# Patient Record
Sex: FEMALE | Race: WHITE | NOT HISPANIC OR LATINO | Employment: OTHER | URBAN - METROPOLITAN AREA
[De-identification: names, ages, dates, MRNs, and addresses within clinical notes are randomized per-mention and may not be internally consistent; named-entity substitution may affect disease eponyms.]

---

## 2017-05-16 ENCOUNTER — OFFICE VISIT (OUTPATIENT)
Dept: ENDOCRINOLOGY | Facility: CLINIC | Age: 77
End: 2017-05-16

## 2017-05-16 VITALS
DIASTOLIC BLOOD PRESSURE: 64 MMHG | OXYGEN SATURATION: 97 % | HEART RATE: 61 BPM | WEIGHT: 139 LBS | BODY MASS INDEX: 25.58 KG/M2 | HEIGHT: 62 IN | SYSTOLIC BLOOD PRESSURE: 132 MMHG

## 2017-05-16 DIAGNOSIS — E04.2 NON-TOXIC MULTINODULAR GOITER: ICD-10-CM

## 2017-05-16 DIAGNOSIS — M83.8 OTHER ADULT OSTEOMALACIA: ICD-10-CM

## 2017-05-16 DIAGNOSIS — I10 BENIGN ESSENTIAL HYPERTENSION: Primary | ICD-10-CM

## 2017-05-16 DIAGNOSIS — M85.9 DISORDER OF BONE DENSITY AND STRUCTURE, UNSPECIFIED: ICD-10-CM

## 2017-05-16 DIAGNOSIS — E87.1 HYPONATREMIA: ICD-10-CM

## 2017-05-16 DIAGNOSIS — I10 ESSENTIAL HYPERTENSION: ICD-10-CM

## 2017-05-16 DIAGNOSIS — E78.01 FAMILIAL HYPERCHOLESTEROLEMIA: ICD-10-CM

## 2017-05-16 PROBLEM — S82.891G CLOSED FRACTURE OF RIGHT ANKLE WITH DELAYED HEALING: Status: ACTIVE | Noted: 2017-05-16

## 2017-05-16 LAB
25(OH)D3+25(OH)D2 SERPL-MCNC: 48.1 NG/ML
ALBUMIN SERPL-MCNC: 4.6 G/DL (ref 3.2–4.8)
ALBUMIN/GLOB SERPL: 1.7 G/DL (ref 1.5–2.5)
ALP SERPL-CCNC: 52 U/L (ref 25–100)
ALT SERPL-CCNC: 17 U/L (ref 7–40)
AST SERPL-CCNC: 21 U/L (ref 0–33)
BASOPHILS # BLD AUTO: 0.11 10*3/MM3 (ref 0–0.2)
BASOPHILS NFR BLD AUTO: 2.1 % (ref 0–1)
BILIRUB SERPL-MCNC: 0.8 MG/DL (ref 0.3–1.2)
BUN SERPL-MCNC: 17 MG/DL (ref 9–23)
BUN/CREAT SERPL: 24.3 (ref 7–25)
CALCIUM SERPL-MCNC: 10.5 MG/DL (ref 8.7–10.4)
CHLORIDE SERPL-SCNC: 104 MMOL/L (ref 99–109)
CHOLEST SERPL-MCNC: 178 MG/DL (ref 0–200)
CO2 SERPL-SCNC: 30 MMOL/L (ref 20–31)
CREAT SERPL-MCNC: 0.7 MG/DL (ref 0.6–1.3)
DIFFERENTIAL COMMENT: NORMAL
EOSINOPHIL # BLD AUTO: 0.17 10*3/MM3 (ref 0.1–0.3)
EOSINOPHIL NFR BLD AUTO: 3.2 % (ref 0–3)
ERYTHROCYTE [DISTWIDTH] IN BLOOD BY AUTOMATED COUNT: 13.6 % (ref 11.3–14.5)
GLOBULIN SER CALC-MCNC: 2.7 GM/DL
GLUCOSE SERPL-MCNC: 107 MG/DL (ref 70–100)
HCT VFR BLD AUTO: 38 % (ref 34.5–44)
HDLC SERPL-MCNC: 50 MG/DL (ref 40–60)
HGB BLD-MCNC: 12.8 G/DL (ref 11.5–15.5)
IMM GRANULOCYTES # BLD: 0.02 10*3/MM3 (ref 0–0.03)
IMM GRANULOCYTES NFR BLD: 0.4 % (ref 0–0.6)
LDLC SERPL CALC-MCNC: 110 MG/DL (ref 0–100)
LYMPHOCYTES # BLD AUTO: 1.59 10*3/MM3 (ref 0.6–4.8)
LYMPHOCYTES NFR BLD AUTO: 30.3 % (ref 24–44)
MCH RBC QN AUTO: 32.7 PG (ref 27–31)
MCHC RBC AUTO-ENTMCNC: 33.7 G/DL (ref 32–36)
MCV RBC AUTO: 97.2 FL (ref 80–99)
MONOCYTES # BLD AUTO: 0.64 10*3/MM3 (ref 0–1)
MONOCYTES NFR BLD AUTO: 12.2 % (ref 0–12)
NEUTROPHILS # BLD AUTO: 2.72 10*3/MM3 (ref 1.5–8.3)
NEUTROPHILS NFR BLD AUTO: 51.8 % (ref 41–71)
PLATELET # BLD AUTO: 128 10*3/MM3 (ref 150–450)
PLATELET BLD QL SMEAR: NORMAL
POTASSIUM SERPL-SCNC: 4.4 MMOL/L (ref 3.5–5.5)
PROT SERPL-MCNC: 7.3 G/DL (ref 5.7–8.2)
RBC # BLD AUTO: 3.91 10*6/MM3 (ref 3.89–5.14)
RBC MORPH BLD: NORMAL
SODIUM SERPL-SCNC: 137 MMOL/L (ref 132–146)
T4 FREE SERPL-MCNC: 1.1 NG/DL (ref 0.89–1.76)
TRIGL SERPL-MCNC: 88 MG/DL (ref 0–150)
TSH SERPL DL<=0.005 MIU/L-ACNC: 2.41 MIU/ML (ref 0.35–5.35)
VLDLC SERPL CALC-MCNC: 17.6 MG/DL
WBC # BLD AUTO: 5.25 10*3/MM3 (ref 3.5–10.8)

## 2017-05-16 PROCEDURE — 99214 OFFICE O/P EST MOD 30 MIN: CPT | Performed by: INTERNAL MEDICINE

## 2017-05-16 RX ORDER — RAMIPRIL 10 MG/1
10 CAPSULE ORAL EVERY 12 HOURS
Qty: 180 CAPSULE | Refills: 3 | Status: SHIPPED | OUTPATIENT
Start: 2017-05-16 | End: 2018-05-21 | Stop reason: SDUPTHER

## 2017-05-16 RX ORDER — AMLODIPINE BESYLATE 5 MG/1
5 TABLET ORAL NIGHTLY
Qty: 90 TABLET | Refills: 3 | Status: SHIPPED | OUTPATIENT
Start: 2017-05-16 | End: 2018-05-21 | Stop reason: SDUPTHER

## 2017-06-12 ENCOUNTER — TRANSCRIBE ORDERS (OUTPATIENT)
Dept: ADMINISTRATIVE | Facility: HOSPITAL | Age: 77
End: 2017-06-12

## 2017-06-12 DIAGNOSIS — Z12.31 VISIT FOR SCREENING MAMMOGRAM: Primary | ICD-10-CM

## 2017-09-13 ENCOUNTER — HOSPITAL ENCOUNTER (OUTPATIENT)
Dept: MAMMOGRAPHY | Facility: HOSPITAL | Age: 77
Discharge: HOME | End: 2017-09-13
Attending: OBSTETRICS & GYNECOLOGY | Admitting: OBSTETRICS & GYNECOLOGY

## 2017-09-13 DIAGNOSIS — Z12.31 VISIT FOR SCREENING MAMMOGRAM: ICD-10-CM

## 2017-09-13 PROCEDURE — 77063 BREAST TOMOSYNTHESIS BI: CPT

## 2017-09-13 PROCEDURE — 77063 BREAST TOMOSYNTHESIS BI: CPT | Performed by: RADIOLOGY

## 2017-09-13 PROCEDURE — G0202 SCR MAMMO BI INCL CAD: HCPCS

## 2017-09-13 PROCEDURE — G0202 SCR MAMMO BI INCL CAD: HCPCS | Performed by: RADIOLOGY

## 2017-11-20 ENCOUNTER — OFFICE VISIT (OUTPATIENT)
Dept: ENDOCRINOLOGY | Facility: CLINIC | Age: 77
End: 2017-11-20

## 2017-11-20 VITALS
WEIGHT: 137 LBS | OXYGEN SATURATION: 98 % | BODY MASS INDEX: 25.86 KG/M2 | HEIGHT: 61 IN | DIASTOLIC BLOOD PRESSURE: 80 MMHG | SYSTOLIC BLOOD PRESSURE: 180 MMHG | HEART RATE: 57 BPM

## 2017-11-20 DIAGNOSIS — I10 BENIGN ESSENTIAL HYPERTENSION: Primary | ICD-10-CM

## 2017-11-20 DIAGNOSIS — E04.2 NON-TOXIC MULTINODULAR GOITER: ICD-10-CM

## 2017-11-20 DIAGNOSIS — E87.6 HYPOKALEMIA: ICD-10-CM

## 2017-11-20 DIAGNOSIS — E55.9 VITAMIN D DEFICIENCY: ICD-10-CM

## 2017-11-20 DIAGNOSIS — E78.01 FAMILIAL HYPERCHOLESTEROLEMIA: ICD-10-CM

## 2017-11-20 LAB
25(OH)D3+25(OH)D2 SERPL-MCNC: 69.8 NG/ML
ALBUMIN SERPL-MCNC: 4.8 G/DL (ref 3.2–4.8)
ALBUMIN/GLOB SERPL: 1.8 G/DL (ref 1.5–2.5)
ALP SERPL-CCNC: 57 U/L (ref 25–100)
ALT SERPL-CCNC: 17 U/L (ref 7–40)
AST SERPL-CCNC: 19 U/L (ref 0–33)
BASOPHILS # BLD AUTO: 0.07 10*3/MM3 (ref 0–0.2)
BASOPHILS NFR BLD AUTO: 1.1 % (ref 0–1)
BILIRUB SERPL-MCNC: 1 MG/DL (ref 0.3–1.2)
BUN SERPL-MCNC: 15 MG/DL (ref 9–23)
BUN/CREAT SERPL: 18.8 (ref 7–25)
CALCIUM SERPL-MCNC: 10 MG/DL (ref 8.7–10.4)
CHLORIDE SERPL-SCNC: 103 MMOL/L (ref 99–109)
CHOLEST SERPL-MCNC: 160 MG/DL (ref 0–200)
CO2 SERPL-SCNC: 30 MMOL/L (ref 20–31)
CREAT SERPL-MCNC: 0.8 MG/DL (ref 0.6–1.3)
DIFFERENTIAL COMMENT: NORMAL
EOSINOPHIL # BLD AUTO: 0.18 10*3/MM3 (ref 0–0.3)
EOSINOPHIL NFR BLD AUTO: 2.8 % (ref 0–3)
ERYTHROCYTE [DISTWIDTH] IN BLOOD BY AUTOMATED COUNT: 13.9 % (ref 11.3–14.5)
GFR SERPLBLD CREATININE-BSD FMLA CKD-EPI: 70 ML/MIN/1.73
GFR SERPLBLD CREATININE-BSD FMLA CKD-EPI: 85 ML/MIN/1.73
GLOBULIN SER CALC-MCNC: 2.6 GM/DL
GLUCOSE SERPL-MCNC: 103 MG/DL (ref 70–100)
HCT VFR BLD AUTO: 37.6 % (ref 34.5–44)
HDLC SERPL-MCNC: 42 MG/DL (ref 40–60)
HGB BLD-MCNC: 12.9 G/DL (ref 11.5–15.5)
IMM GRANULOCYTES # BLD: 0.02 10*3/MM3 (ref 0–0.03)
IMM GRANULOCYTES NFR BLD: 0.3 % (ref 0–0.6)
LDLC SERPL CALC-MCNC: 94 MG/DL (ref 0–100)
LYMPHOCYTES # BLD AUTO: 1.63 10*3/MM3 (ref 0.6–4.8)
LYMPHOCYTES NFR BLD AUTO: 25.6 % (ref 24–44)
MCH RBC QN AUTO: 33.9 PG (ref 27–31)
MCHC RBC AUTO-ENTMCNC: 34.3 G/DL (ref 32–36)
MCV RBC AUTO: 98.9 FL (ref 80–99)
MONOCYTES # BLD AUTO: 0.64 10*3/MM3 (ref 0–1)
MONOCYTES NFR BLD AUTO: 10 % (ref 0–12)
NEUTROPHILS # BLD AUTO: 3.83 10*3/MM3 (ref 1.5–8.3)
NEUTROPHILS NFR BLD AUTO: 60.2 % (ref 41–71)
PLATELET # BLD AUTO: 119 10*3/MM3 (ref 150–450)
PLATELET BLD QL SMEAR: NORMAL
POTASSIUM SERPL-SCNC: 4.4 MMOL/L (ref 3.5–5.5)
PROT SERPL-MCNC: 7.4 G/DL (ref 5.7–8.2)
RBC # BLD AUTO: 3.8 10*6/MM3 (ref 3.89–5.14)
RBC MORPH BLD: NORMAL
SODIUM SERPL-SCNC: 138 MMOL/L (ref 132–146)
TRIGL SERPL-MCNC: 118 MG/DL (ref 0–150)
TSH SERPL DL<=0.005 MIU/L-ACNC: 2.34 MIU/ML (ref 0.35–5.35)
VLDLC SERPL CALC-MCNC: 23.6 MG/DL
WBC # BLD AUTO: 6.37 10*3/MM3 (ref 3.5–10.8)

## 2017-11-20 PROCEDURE — 99214 OFFICE O/P EST MOD 30 MIN: CPT | Performed by: INTERNAL MEDICINE

## 2017-11-20 PROCEDURE — 90662 IIV NO PRSV INCREASED AG IM: CPT | Performed by: INTERNAL MEDICINE

## 2017-11-20 PROCEDURE — 90471 IMMUNIZATION ADMIN: CPT | Performed by: INTERNAL MEDICINE

## 2017-11-20 NOTE — PROGRESS NOTES
Zainab PONCE [place] 77 y.o.  CC:Follow-up; Hypertension; Multinodular goiter; Hyperlipidemia; and Hypokalemia      [place]: Follow-up; Hypertension; Multinodular goiter; Hyperlipidemia; and Hypokalemia    bp is good   Is on a low fat diet  Is on ace inhibitor, no potassium supplement   MNG - saw Dr Oscar in past- [facility] follow up but no intervention  Right ankle post op - some swelling still but no pain   Pulled hamstring left - is taking a break from walking  Continue to see Dr Merino     Allergies   Allergen Reactions   • Latex        Current Outpatient Prescriptions:   •  amLODIPine (NORVASC) 5 MG tablet, Take 1 tablet by mouth Every Night., Disp: 90 tablet, Rfl: 3  •  aspirin (ASPIRIN LOW DOSE) 81 MG tablet, Take 1 tablet by mouth daily., Disp: , Rfl:   •  Cholecalciferol (VITAMIN D3) 1000 UNITS capsule, Take 2 capsules by mouth daily., Disp: , Rfl:   •  calcium citrate-vitamin d (CITRACAL PETITES/VITAMIN D) 200-250 MG-UNIT tablet tablet, Take 3 tablets by mouth daily., Disp: , Rfl:   •  clotrimazole-betamethasone (LOTRISONE) 1-0.05 % cream, , Disp: , Rfl: 1  •  mesalamine (APRISO) 0.375 G 24 hr capsule, Take 1 capsule by mouth 2 (two) times a day., Disp: , Rfl:   •  Misc Natural Products (OSTEO BI-FLEX TRIPLE STRENGTH) tablet, Take  by mouth., Disp: , Rfl:   •  Multiple Vitamins-Minerals (CENTRUM SILVER) tablet, Take 1 tablet by mouth daily., Disp: , Rfl:   •  Omega-3 Fatty Acids (FISH OIL) 1200 MG capsule capsule, Take 1 capsule by mouth daily., Disp: , Rfl:   •  ramipril (ALTACE) 10 MG capsule, Take 1 capsule by mouth Every 12 (Twelve) Hours., Disp: 180 capsule, Rfl: 3  •  Turmeric 450 MG capsule, Take  by mouth., Disp: , Rfl:   Patient Active Problem List    Diagnosis   • Closed fracture of right ankle with delayed healing [S82.891G]   • Disorder of bone density and structure, unspecified [M85.9]   • Other adult osteomalacia  [M83.8]   • Acute cystitis without hematuria [N30.00]   • Benign essential hypertension [I10]      Overview Note:     Impression: 05/18/2015 - bp is good at home  Impression: 05/20/2014 - bp is good overall  continue to monitor- no changes at present;      • Cardiac arrhythmia [I49.9]   • Cervical radiculopathy [M54.12]   • H/O colonoscopy [Z98.890]     Overview Note:     Description: 6/24/11- ulcerative colitis- Ruff  5/14 ruff    5/17 ruff -stable      • Migraine [G43.909]     Overview Note:     . Common migraine without aura      • Gastroesophageal reflux disease [K21.9]   • Familial hypercholesterolemia [E78.01]     Overview Note:     Impression: 11/19/2015 - check flp  Impression: 05/18/2015 - check flp  Impression: 05/20/2014 - check flp;      • Diffuse goiter [E04.9]     Overview Note:     Impression: 05/20/2014 - check u/s- updated u/s scheduled; Description: goiter- 9/11- second opinion Dayne- no intervention     • Hypertension [I10]     Overview Note:     Impression: 11/19/2015 - bp is good;      • Hypokalemia [E87.6]     Overview Note:     Impression: 05/18/2015 - check cmp;      • Hyponatremia [E87.1]     Overview Note:     Impression: 11/19/2015 - check cmp  Impression: 05/20/2014 - update cmp; Description: ?  d/c dyazide     • Knee pain [M25.569]     Overview Note:     Impression: 11/19/2015 - had injection   PT order provided   discussed nsaid, watch bp while using;      • Low back pain [M54.5]     Overview Note:     Description: Damian - Lumbar disc dz- 6/13 better with PT and TENS unit- will see back prn.     • Abnormal mammogram [R92.8]   • Non-toxic multinodular goiter [E04.2]     Overview Note:     Description: Stable 9/11 - Saw Aydin Oscar- rec no intervent- just follow     • Sacroiliac joint somatic dysfunction [M99.04]     Overview Note:     Description: [place]- mobic and tens     • Thrombocytopenia [D69.6]     Overview Note:     Impression: 05/18/2015 - check cbc  Impression: 05/20/2014 - update cbc;      • Tick bite [W57.XXXA]     Overview Note:     Impression: 05/18/2015 - left  neck- keep area clean and dry- call if fever or other problems   discussed possibility of Lyme disease and she will call if she develops fever or chills;      • Ulcerative colitis [K51.90]     Overview Note:     Description: Primitivo     • Varicose veins [I83.90]   • Vasovagal syncope [R55]   • Candidiasis of vagina [B37.3]     Overview Note:     Impression: 11/19/2015 - recurrent- cont cream and diflucan rx provided prn;        Review of Systems   Constitutional: Negative for activity change, appetite change, chills, diaphoresis, fatigue, fever and unexpected weight change.   HENT: Negative for congestion, dental problem, drooling, ear discharge, ear pain, facial swelling, hearing loss, mouth sores, nosebleeds, postnasal drip, rhinorrhea, sinus pressure, sneezing, sore throat, tinnitus, trouble swallowing and voice change.    Eyes: Negative for photophobia, pain, discharge, redness, itching and visual disturbance.   Respiratory: Negative for apnea, cough, choking, chest tightness, shortness of breath, wheezing and stridor.    Cardiovascular: Negative for chest pain, palpitations and leg swelling.   Gastrointestinal: Negative for abdominal distention, abdominal pain, anal bleeding, blood in stool, constipation, diarrhea, nausea, rectal pain and vomiting.        Ulcerative colitis- on apreslo    Endocrine: Negative for cold intolerance, heat intolerance, polydipsia, polyphagia and polyuria.   Genitourinary: Negative for decreased urine volume, difficulty urinating, dysuria, enuresis, flank pain, frequency, genital sores, hematuria and urgency.   Musculoskeletal: Negative for arthralgias, back pain, gait problem, joint swelling, myalgias, neck pain and neck stiffness.   Skin: Negative for color change, pallor, rash and wound.   Allergic/Immunologic: Negative for environmental allergies, food allergies and immunocompromised state.   Neurological: Negative for dizziness, tremors, seizures, syncope, facial asymmetry,  "speech difficulty, weakness, light-headedness, numbness and headaches.   Hematological: Negative for adenopathy. Does not bruise/bleed easily.   Psychiatric/Behavioral: Negative for agitation, behavioral problems, confusion, decreased concentration, dysphoric mood, hallucinations, self-injury, sleep disturbance and suicidal ideas. The patient is not nervous/anxious and is not hyperactive.      Social History     Social History   • Marital status:      Spouse name: N/A   • Number of children: N/A   • Years of education: N/A     Occupational History   • Not on file.     Social History Main Topics   • Smoking status: Never Smoker   • Smokeless tobacco: Not on file   • Alcohol use No   • Drug use: No   • Sexual activity: Defer     Other Topics Concern   • Not on file     Social History Narrative     Family History   Problem Relation Age of Onset   • Heart disease Mother    • Heart disease Father    • Cancer Father      colon   • Alcohol abuse Father    • Hypertension Sister    • Cancer Brother    • Heart disease Brother    • Breast cancer Neg Hx    • Ovarian cancer Neg Hx      /80  Pulse 57  Ht 61\" (154.9 cm)  Wt 137 lb (62.1 kg)  LMP  (LMP Unknown)  SpO2 98%  BMI 25.89 kg/m2  Physical Exam   Constitutional: She is oriented to person, place, and time. She appears well-developed and well-nourished.   Recheck bp 162/78   HENT:   Head: Normocephalic and atraumatic.   Nose: Nose normal.   Mouth/Throat: Oropharynx is clear and moist.   Eyes: Conjunctivae, EOM and lids are normal. Pupils are equal, round, and reactive to light.   Neck: Trachea normal and normal range of motion. Neck supple. Carotid bruit is not present. No tracheal deviation present. No thyroid mass and no thyromegaly present.   Cardiovascular: Normal rate, regular rhythm, normal heart sounds and intact distal pulses.  Exam reveals no gallop and no friction rub.    No murmur heard.  Pulmonary/Chest: Effort normal and breath sounds normal. " No respiratory distress. She has no wheezes.   Musculoskeletal: Normal range of motion. She exhibits edema. She exhibits no deformity.   Lymphadenopathy:     She has no cervical adenopathy.   Neurological: She is alert and oriented to person, place, and time. She has normal reflexes. She displays normal reflexes. No cranial nerve deficit.   Skin: Skin is warm and dry. No rash noted. No cyanosis or erythema. Nails show no clubbing.   Psychiatric: She has a normal mood and affect. Her speech is normal and behavior is normal. Judgment and thought content normal. Cognition and memory are normal.   Nursing note and vitals reviewed.    Results for orders placed or performed in visit on 05/16/17   TSH   Result Value Ref Range    TSH 2.407 0.350 - 5.350 mIU/mL   T4, Free   Result Value Ref Range    Free T4 1.10 0.89 - 1.76 ng/dL   Comprehensive Metabolic Panel   Result Value Ref Range    Glucose 107 (H) 70 - 100 mg/dL    BUN 17 9 - 23 mg/dL    Creatinine 0.70 0.60 - 1.30 mg/dL    eGFR Non African Am 82 >60 mL/min/1.73    eGFR African Am 99 >60 mL/min/1.73    BUN/Creatinine Ratio 24.3 7.0 - 25.0    Sodium 137 132 - 146 mmol/L    Potassium 4.4 3.5 - 5.5 mmol/L    Chloride 104 99 - 109 mmol/L    Total CO2 30.0 20.0 - 31.0 mmol/L    Calcium 10.5 (H) 8.7 - 10.4 mg/dL    Total Protein 7.3 5.7 - 8.2 g/dL    Albumin 4.60 3.20 - 4.80 g/dL    Globulin 2.7 gm/dL    A/G Ratio 1.7 1.5 - 2.5 g/dL    Total Bilirubin 0.8 0.3 - 1.2 mg/dL    Alkaline Phosphatase 52 25 - 100 U/L    AST (SGOT) 21 0 - 33 U/L    ALT (SGPT) 17 7 - 40 U/L   Lipid Panel   Result Value Ref Range    Total Cholesterol 178 0 - 200 mg/dL    Triglycerides 88 0 - 150 mg/dL    HDL Cholesterol 50 40 - 60 mg/dL    VLDL Cholesterol 17.6 mg/dL    LDL Cholesterol  110 (H) 0 - 100 mg/dL   Vitamin D 25 Hydroxy   Result Value Ref Range    25 Hydroxy, Vitamin D 48.1 ng/mL   CBC & Differential   Result Value Ref Range    WBC 5.25 3.50 - 10.80 10*3/mm3    RBC 3.91 3.89 - 5.14  10*6/mm3    Hemoglobin 12.8 11.5 - 15.5 g/dL    Hematocrit 38.0 34.5 - 44.0 %    MCV 97.2 80.0 - 99.0 fL    MCH 32.7 (H) 27.0 - 31.0 pg    MCHC 33.7 32.0 - 36.0 g/dL    RDW 13.6 11.3 - 14.5 %    Platelets 128 (L) 150 - 450 10*3/mm3    Neutrophil Rel % 51.8 41.0 - 71.0 %    Lymphocyte Rel % 30.3 24.0 - 44.0 %    Monocyte Rel % 12.2 (H) 0.0 - 12.0 %    Eosinophil Rel % 3.2 (H) 0.0 - 3.0 %    Basophil Rel % 2.1 (H) 0.0 - 1.0 %    Neutrophils Absolute 2.72 1.50 - 8.30 10*3/mm3    Lymphocytes Absolute 1.59 0.60 - 4.80 10*3/mm3    Monocytes Absolute 0.64 0.00 - 1.00 10*3/mm3    Eosinophils Absolute 0.17 0.10 - 0.30 10*3/mm3    Basophils Absolute 0.11 0.00 - 0.20 10*3/mm3    Immature Granulocyte Rel % 0.4 0.0 - 0.6 %    Immature Grans Absolute 0.02 0.00 - 0.03 10*3/mm3   Manual Differential   Result Value Ref Range    Differential Comment Comment     Comment Comment     Plt Comment Comment      Problem List Items Addressed This Visit        Cardiovascular and Mediastinum    Benign essential hypertension - Primary     bp is better with recheck   Discussed monitoring and call / email if over 145/85   Watch salt          Relevant Orders    CBC & Differential    Familial hypercholesterolemia     Check flp          Relevant Orders    Comprehensive Metabolic Panel    Lipid Panel       Digestive    Vitamin D deficiency     Update vitamin D levels          Relevant Orders    Vitamin D 25 Hydroxy       Endocrine    Non-toxic multinodular goiter     Exam stable   No dysphagia or hoarseness  occ choking          Relevant Orders    TSH       Other    Hypokalemia     Check cmp              Return in about 6 months (around 5/20/2018) for Recheck 30 min .      Liberty Berry MA  Signed Cynthia Farmer MD

## 2018-05-21 ENCOUNTER — OFFICE VISIT (OUTPATIENT)
Dept: ENDOCRINOLOGY | Facility: CLINIC | Age: 78
End: 2018-05-21

## 2018-05-21 VITALS
WEIGHT: 140 LBS | HEART RATE: 72 BPM | OXYGEN SATURATION: 97 % | HEIGHT: 61 IN | DIASTOLIC BLOOD PRESSURE: 78 MMHG | SYSTOLIC BLOOD PRESSURE: 127 MMHG | BODY MASS INDEX: 26.43 KG/M2

## 2018-05-21 DIAGNOSIS — I10 ESSENTIAL HYPERTENSION: ICD-10-CM

## 2018-05-21 DIAGNOSIS — L60.0 INGROWN RIGHT BIG TOENAIL: ICD-10-CM

## 2018-05-21 DIAGNOSIS — G31.84 MILD COGNITIVE IMPAIRMENT: ICD-10-CM

## 2018-05-21 DIAGNOSIS — E55.9 VITAMIN D DEFICIENCY: ICD-10-CM

## 2018-05-21 DIAGNOSIS — E04.2 NON-TOXIC MULTINODULAR GOITER: ICD-10-CM

## 2018-05-21 DIAGNOSIS — E78.01 FAMILIAL HYPERCHOLESTEROLEMIA: Primary | ICD-10-CM

## 2018-05-21 LAB
ALBUMIN SERPL-MCNC: 4.5 G/DL (ref 3.2–4.8)
ALBUMIN/GLOB SERPL: 1.6 G/DL (ref 1.5–2.5)
ALP SERPL-CCNC: 57 U/L (ref 25–100)
ALT SERPL-CCNC: 21 U/L (ref 7–40)
AST SERPL-CCNC: 19 U/L (ref 0–33)
BILIRUB SERPL-MCNC: 0.9 MG/DL (ref 0.3–1.2)
BUN SERPL-MCNC: 14 MG/DL (ref 9–23)
BUN/CREAT SERPL: 17.5 (ref 7–25)
CALCIUM SERPL-MCNC: 9.7 MG/DL (ref 8.7–10.4)
CHLORIDE SERPL-SCNC: 104 MMOL/L (ref 99–109)
CHOLEST SERPL-MCNC: 153 MG/DL (ref 0–200)
CO2 SERPL-SCNC: 28 MMOL/L (ref 20–31)
CREAT SERPL-MCNC: 0.8 MG/DL (ref 0.6–1.3)
GFR SERPLBLD CREATININE-BSD FMLA CKD-EPI: 70 ML/MIN/1.73
GFR SERPLBLD CREATININE-BSD FMLA CKD-EPI: 85 ML/MIN/1.73
GLOBULIN SER CALC-MCNC: 2.9 GM/DL
GLUCOSE SERPL-MCNC: 108 MG/DL (ref 70–100)
HDLC SERPL-MCNC: 44 MG/DL (ref 40–60)
LDLC SERPL CALC-MCNC: 90 MG/DL (ref 0–100)
POTASSIUM SERPL-SCNC: 4.4 MMOL/L (ref 3.5–5.5)
PROT SERPL-MCNC: 7.4 G/DL (ref 5.7–8.2)
SODIUM SERPL-SCNC: 138 MMOL/L (ref 132–146)
TRIGL SERPL-MCNC: 96 MG/DL (ref 0–150)
TSH SERPL DL<=0.005 MIU/L-ACNC: 2.08 MIU/ML (ref 0.35–5.35)
VLDLC SERPL CALC-MCNC: 19.2 MG/DL

## 2018-05-21 PROCEDURE — 99214 OFFICE O/P EST MOD 30 MIN: CPT | Performed by: INTERNAL MEDICINE

## 2018-05-21 RX ORDER — AMLODIPINE BESYLATE 5 MG/1
5 TABLET ORAL NIGHTLY
Qty: 90 TABLET | Refills: 3 | Status: SHIPPED | OUTPATIENT
Start: 2018-05-21 | End: 2019-06-07 | Stop reason: SDUPTHER

## 2018-05-21 RX ORDER — MEMANTINE HYDROCHLORIDE 5 MG/1
5 TABLET ORAL 2 TIMES DAILY
Qty: 60 TABLET | Refills: 5 | Status: SHIPPED | OUTPATIENT
Start: 2018-05-21 | End: 2018-11-27 | Stop reason: SDUPTHER

## 2018-05-21 RX ORDER — RAMIPRIL 10 MG/1
10 CAPSULE ORAL EVERY 12 HOURS
Qty: 180 CAPSULE | Refills: 3 | Status: SHIPPED | OUTPATIENT
Start: 2018-05-21 | End: 2019-06-08 | Stop reason: SDUPTHER

## 2018-07-16 ENCOUNTER — TRANSCRIBE ORDERS (OUTPATIENT)
Dept: ADMINISTRATIVE | Facility: HOSPITAL | Age: 78
End: 2018-07-16

## 2018-07-16 DIAGNOSIS — Z12.31 VISIT FOR SCREENING MAMMOGRAM: Primary | ICD-10-CM

## 2018-09-20 ENCOUNTER — HOSPITAL ENCOUNTER (OUTPATIENT)
Dept: MAMMOGRAPHY | Facility: HOSPITAL | Age: 78
Discharge: HOME | End: 2018-09-20
Attending: OBSTETRICS & GYNECOLOGY | Admitting: OBSTETRICS & GYNECOLOGY

## 2018-09-20 DIAGNOSIS — Z12.31 VISIT FOR SCREENING MAMMOGRAM: ICD-10-CM

## 2018-09-20 PROCEDURE — 77067 SCR MAMMO BI INCL CAD: CPT

## 2018-09-20 PROCEDURE — 77063 BREAST TOMOSYNTHESIS BI: CPT | Performed by: RADIOLOGY

## 2018-09-20 PROCEDURE — 77063 BREAST TOMOSYNTHESIS BI: CPT

## 2018-09-20 PROCEDURE — 77067 SCR MAMMO BI INCL CAD: CPT | Performed by: RADIOLOGY

## 2018-11-27 ENCOUNTER — OFFICE VISIT (OUTPATIENT)
Dept: ENDOCRINOLOGY | Facility: CLINIC | Age: 78
End: 2018-11-27

## 2018-11-27 VITALS
SYSTOLIC BLOOD PRESSURE: 144 MMHG | HEART RATE: 62 BPM | DIASTOLIC BLOOD PRESSURE: 66 MMHG | HEIGHT: 61 IN | OXYGEN SATURATION: 98 % | BODY MASS INDEX: 26.75 KG/M2 | WEIGHT: 141.7 LBS

## 2018-11-27 DIAGNOSIS — I10 ESSENTIAL HYPERTENSION: ICD-10-CM

## 2018-11-27 DIAGNOSIS — E55.9 VITAMIN D DEFICIENCY: ICD-10-CM

## 2018-11-27 DIAGNOSIS — E04.2 NON-TOXIC MULTINODULAR GOITER: ICD-10-CM

## 2018-11-27 DIAGNOSIS — E78.01 FAMILIAL HYPERCHOLESTEROLEMIA: Primary | ICD-10-CM

## 2018-11-27 LAB
25(OH)D3+25(OH)D2 SERPL-MCNC: 67 NG/ML
ALBUMIN SERPL-MCNC: 4.62 G/DL (ref 3.2–4.8)
ALBUMIN/GLOB SERPL: 1.9 G/DL (ref 1.5–2.5)
ALP SERPL-CCNC: 47 U/L (ref 25–100)
ALT SERPL-CCNC: 23 U/L (ref 7–40)
AST SERPL-CCNC: 21 U/L (ref 0–33)
BILIRUB SERPL-MCNC: 0.9 MG/DL (ref 0.3–1.2)
BUN SERPL-MCNC: 14 MG/DL (ref 9–23)
BUN/CREAT SERPL: 17.7 (ref 7–25)
CALCIUM SERPL-MCNC: 9.9 MG/DL (ref 8.7–10.4)
CHLORIDE SERPL-SCNC: 106 MMOL/L (ref 99–109)
CHOLEST SERPL-MCNC: 160 MG/DL (ref 0–200)
CO2 SERPL-SCNC: 24 MMOL/L (ref 20–31)
CREAT SERPL-MCNC: 0.79 MG/DL (ref 0.6–1.3)
GLOBULIN SER CALC-MCNC: 2.4 GM/DL
GLUCOSE SERPL-MCNC: 98 MG/DL (ref 70–100)
HDLC SERPL-MCNC: 47 MG/DL (ref 40–60)
LDLC SERPL CALC-MCNC: 94 MG/DL (ref 0–100)
POTASSIUM SERPL-SCNC: 4.5 MMOL/L (ref 3.5–5.5)
PROT SERPL-MCNC: 7 G/DL (ref 5.7–8.2)
SODIUM SERPL-SCNC: 138 MMOL/L (ref 132–146)
TRIGL SERPL-MCNC: 94 MG/DL (ref 0–150)
TSH SERPL DL<=0.005 MIU/L-ACNC: 2.43 MIU/ML (ref 0.35–5.35)
VLDLC SERPL CALC-MCNC: 18.8 MG/DL

## 2018-11-27 PROCEDURE — 99214 OFFICE O/P EST MOD 30 MIN: CPT | Performed by: INTERNAL MEDICINE

## 2018-11-27 PROCEDURE — 90662 IIV NO PRSV INCREASED AG IM: CPT | Performed by: INTERNAL MEDICINE

## 2018-11-27 PROCEDURE — G0008 ADMIN INFLUENZA VIRUS VAC: HCPCS | Performed by: INTERNAL MEDICINE

## 2018-11-27 RX ORDER — MEMANTINE HYDROCHLORIDE 5 MG/1
5 TABLET ORAL 2 TIMES DAILY
Qty: 180 TABLET | Refills: 1 | Status: SHIPPED | OUTPATIENT
Start: 2018-11-27 | End: 2019-05-24 | Stop reason: SDUPTHER

## 2018-11-27 NOTE — PROGRESS NOTES
Zainab Golden 78 y.o.  CC:Follow-up; Hypertension; MNG; Hyperlipidemia; and Hypokalemia      [place]: Follow-up; Hypertension; MNG; Hyperlipidemia; and Hypokalemia    bp is good   Is on low fat diet  Had fall in June- caught foot on car door   Cut eye   Is back to walking now - recent hamstring injury   Is using biotin supplement for hair   Right hip pain over ischial tuberosity   Is sitting on pillow   No pos slr   Recently started bothering her   Also using cold compresses to ankles and hip   Is on low fat diet  No mm cramps     Allergies   Allergen Reactions   • Latex Other (See Comments)     Redness from adhesive tape       Current Outpatient Medications:   •  amLODIPine (NORVASC) 5 MG tablet, Take 1 tablet by mouth Every Night., Disp: 90 tablet, Rfl: 3  •  aspirin (ASPIRIN LOW DOSE) 81 MG tablet, Take 1 tablet by mouth daily., Disp: , Rfl:   •  calcium citrate-vitamin d (CITRACAL PETITES/VITAMIN D) 200-250 MG-UNIT tablet tablet, Take 3 tablets by mouth daily., Disp: , Rfl:   •  Cholecalciferol (VITAMIN D3) 1000 UNITS capsule, Take 2 capsules by mouth daily., Disp: , Rfl:   •  clotrimazole-betamethasone (LOTRISONE) 1-0.05 % cream, , Disp: , Rfl: 1  •  memantine (NAMENDA) 5 MG tablet, Take 1 tablet by mouth 2 (Two) Times a Day., Disp: 60 tablet, Rfl: 5  •  mesalamine (APRISO) 0.375 G 24 hr capsule, Take 1 capsule by mouth 2 (two) times a day., Disp: , Rfl:   •  Misc Natural Products (OSTEO BI-FLEX TRIPLE STRENGTH) tablet, Take  by mouth., Disp: , Rfl:   •  Multiple Vitamins-Minerals (CENTRUM SILVER) tablet, Take 1 tablet by mouth daily., Disp: , Rfl:   •  Multiple Vitamins-Minerals (HAIR FORMULA EXTRA STRENGTH PO), Take  by mouth., Disp: , Rfl:   •  Omega-3 Fatty Acids (FISH OIL) 1200 MG capsule capsule, Take 1 capsule by mouth daily., Disp: , Rfl:   •  ramipril (ALTACE) 10 MG capsule, Take 1 capsule by mouth Every 12 (Twelve) Hours., Disp: 180 capsule, Rfl: 3  •  Turmeric 450 MG capsule, Take  by mouth., Disp: ,  Rfl:   Patient Active Problem List    Diagnosis   • Ingrown right big toenail [L60.0]   • Mild cognitive impairment [G31.84]   • Vitamin D deficiency [E55.9]   • Closed fracture of right ankle with delayed healing [S82.891G]   • Disorder of bone density and structure, unspecified [M85.9]   • Other adult osteomalacia  [M83.8]   • Acute cystitis without hematuria [N30.00]   • Benign essential hypertension [I10]   • Cardiac arrhythmia [I49.9]   • Cervical radiculopathy [M54.12]   • H/O colonoscopy [Z98.890]   • Migraine [G43.909]   • Gastroesophageal reflux disease [K21.9]   • Familial hypercholesterolemia [E78.01]   • Diffuse goiter [E04.9]   • Hypertension [I10]   • Hypokalemia [E87.6]   • Hyponatremia [E87.1]   • Knee pain [M25.569]   • Low back pain [M54.5]   • Abnormal mammogram [R92.8]   • Non-toxic multinodular goiter [E04.2]   • Sacroiliac joint somatic dysfunction [M99.04]   • Thrombocytopenia (CMS/HCC) [D69.6]   • Tick bite [W57.XXXA]   • Ulcerative colitis (CMS/HCC) [K51.90]   • Varicose veins [I83.90]   • Vasovagal syncope [R55]   • Candidiasis of vagina [B37.3]     Review of Systems   Constitutional: Negative for activity change, appetite change, chills, diaphoresis, fatigue, fever and unexpected weight change.   HENT: Negative for congestion, dental problem, drooling, ear discharge, ear pain, facial swelling, hearing loss, mouth sores, nosebleeds, postnasal drip, rhinorrhea, sinus pressure, sneezing, sore throat, tinnitus, trouble swallowing and voice change.    Eyes: Negative for photophobia, pain, discharge, redness, itching and visual disturbance.   Respiratory: Negative for apnea, cough, choking, chest tightness, shortness of breath, wheezing and stridor.    Cardiovascular: Negative for chest pain, palpitations and leg swelling.   Gastrointestinal: Negative for abdominal distention, abdominal pain, anal bleeding, blood in stool, constipation, diarrhea, nausea, rectal pain and vomiting.   Endocrine:  Negative for cold intolerance, heat intolerance, polydipsia, polyphagia and polyuria.   Genitourinary: Negative for decreased urine volume, difficulty urinating, dysuria, enuresis, flank pain, frequency, genital sores, hematuria and urgency.   Musculoskeletal: Positive for arthralgias and myalgias. Negative for back pain, gait problem, joint swelling, neck pain and neck stiffness.   Skin: Negative for color change, pallor, rash and wound.   Allergic/Immunologic: Negative for environmental allergies, food allergies and immunocompromised state.   Neurological: Negative for dizziness, tremors, seizures, syncope, facial asymmetry, speech difficulty, weakness, light-headedness, numbness and headaches.   Hematological: Negative for adenopathy. Does not bruise/bleed easily.   Psychiatric/Behavioral: Negative for agitation, behavioral problems, confusion, decreased concentration, dysphoric mood, hallucinations, self-injury, sleep disturbance and suicidal ideas. The patient is not nervous/anxious and is not hyperactive.      Social History     Socioeconomic History   • Marital status:      Spouse name: Not on file   • Number of children: Not on file   • Years of education: Not on file   • Highest education level: Not on file   Social Needs   • Financial resource strain: Not on file   • Food insecurity - worry: Not on file   • Food insecurity - inability: Not on file   • Transportation needs - medical: Not on file   • Transportation needs - non-medical: Not on file   Occupational History   • Not on file   Tobacco Use   • Smoking status: Never Smoker   • Smokeless tobacco: Current User   Substance and Sexual Activity   • Alcohol use: No   • Drug use: No   • Sexual activity: Defer   Other Topics Concern   • Not on file   Social History Narrative   • Not on file     Family History   Problem Relation Age of Onset   • Heart disease Mother    • Heart disease Father    • Cancer Father         colon   • Alcohol abuse Father   "  • Hypertension Sister    • Cancer Brother    • Heart disease Brother    • Breast cancer Neg Hx    • Ovarian cancer Neg Hx      /66   Pulse 62   Ht 155.6 cm (61.25\")   Wt 64.3 kg (141 lb 11.2 oz)   LMP  (LMP Unknown)   SpO2 98%   Breastfeeding? No   BMI 26.56 kg/m²   Physical Exam   Constitutional: She is oriented to person, place, and time. She appears well-developed and well-nourished.   HENT:   Head: Normocephalic and atraumatic.   Nose: Nose normal.   Mouth/Throat: Oropharynx is clear and moist.   Eyes: Conjunctivae, EOM and lids are normal. Pupils are equal, round, and reactive to light.   Neck: Trachea normal and normal range of motion. Neck supple. Carotid bruit is not present. No tracheal deviation present. No thyroid mass and no thyromegaly present.   Cardiovascular: Normal rate, regular rhythm, normal heart sounds and intact distal pulses. Exam reveals no gallop and no friction rub.   No murmur heard.  Pulmonary/Chest: Effort normal and breath sounds normal. No respiratory distress. She has no wheezes.   Musculoskeletal: Normal range of motion. She exhibits edema. She exhibits no deformity.   Lymphadenopathy:     She has no cervical adenopathy.   Neurological: She is alert and oriented to person, place, and time. She has normal reflexes. She displays normal reflexes. No cranial nerve deficit.   Skin: Skin is warm and dry. No rash noted. No cyanosis or erythema. Nails show no clubbing.   Psychiatric: She has a normal mood and affect. Her speech is normal and behavior is normal. Judgment and thought content normal. Cognition and memory are normal.   Nursing note and vitals reviewed.    Results for orders placed or performed in visit on 05/21/18   Comprehensive Metabolic Panel   Result Value Ref Range    Glucose 108 (H) 70 - 100 mg/dL    BUN 14 9 - 23 mg/dL    Creatinine 0.80 0.60 - 1.30 mg/dL    eGFR Non African Am 70 >60 mL/min/1.73    eGFR African Am 85 >60 mL/min/1.73    BUN/Creatinine Ratio " 17.5 7.0 - 25.0    Sodium 138 132 - 146 mmol/L    Potassium 4.4 3.5 - 5.5 mmol/L    Chloride 104 99 - 109 mmol/L    Total CO2 28.0 20.0 - 31.0 mmol/L    Calcium 9.7 8.7 - 10.4 mg/dL    Total Protein 7.4 5.7 - 8.2 g/dL    Albumin 4.50 3.20 - 4.80 g/dL    Globulin 2.9 gm/dL    A/G Ratio 1.6 1.5 - 2.5 g/dL    Total Bilirubin 0.9 0.3 - 1.2 mg/dL    Alkaline Phosphatase 57 25 - 100 U/L    AST (SGOT) 19 0 - 33 U/L    ALT (SGPT) 21 7 - 40 U/L   Lipid Panel   Result Value Ref Range    Total Cholesterol 153 0 - 200 mg/dL    Triglycerides 96 0 - 150 mg/dL    HDL Cholesterol 44 40 - 60 mg/dL    VLDL Cholesterol 19.2 mg/dL    LDL Cholesterol  90 0 - 100 mg/dL   TSH   Result Value Ref Range    TSH 2.075 0.350 - 5.350 mIU/mL     Problem List Items Addressed This Visit        Cardiovascular and Mediastinum    Hypertension     bp is good- continue current medications and monitoring          Familial hypercholesterolemia - Primary     Continue diet and medication  Resume walking regimen          Relevant Orders    Comprehensive Metabolic Panel    Lipid Panel       Digestive    Vitamin D deficiency     Update levels  Continue supplement   Benefits discussed         Relevant Orders    Vitamin D 25 Hydroxy       Endocrine    Non-toxic multinodular goiter     Stable exam  No dysphagia or hoarseness          Relevant Orders    TSH        Return in about 6 months (around 5/27/2019) for Recheck 30 min .    Liberty Berry MA  Signed Cynthia Farmer MD.[facility]

## 2019-05-24 RX ORDER — MEMANTINE HYDROCHLORIDE 5 MG/1
5 TABLET ORAL 2 TIMES DAILY
Qty: 180 TABLET | Refills: 1 | Status: SHIPPED | OUTPATIENT
Start: 2019-05-24 | End: 2019-11-18 | Stop reason: SDUPTHER

## 2019-05-31 ENCOUNTER — OFFICE VISIT (OUTPATIENT)
Dept: ENDOCRINOLOGY | Facility: CLINIC | Age: 79
End: 2019-05-31

## 2019-05-31 VITALS
HEART RATE: 58 BPM | WEIGHT: 141.4 LBS | DIASTOLIC BLOOD PRESSURE: 64 MMHG | HEIGHT: 61 IN | SYSTOLIC BLOOD PRESSURE: 138 MMHG | BODY MASS INDEX: 26.7 KG/M2 | OXYGEN SATURATION: 97 %

## 2019-05-31 DIAGNOSIS — I10 BENIGN ESSENTIAL HYPERTENSION: Primary | ICD-10-CM

## 2019-05-31 DIAGNOSIS — E87.1 HYPONATREMIA: ICD-10-CM

## 2019-05-31 DIAGNOSIS — E78.01 FAMILIAL HYPERCHOLESTEROLEMIA: ICD-10-CM

## 2019-05-31 DIAGNOSIS — E78.00 PURE HYPERCHOLESTEROLEMIA: ICD-10-CM

## 2019-05-31 DIAGNOSIS — D69.6 THROMBOCYTOPENIA (HCC): ICD-10-CM

## 2019-05-31 PROCEDURE — 99213 OFFICE O/P EST LOW 20 MIN: CPT | Performed by: INTERNAL MEDICINE

## 2019-05-31 RX ORDER — IMIQUIMOD 12.5 MG/.25G
CREAM TOPICAL
Refills: 3 | COMMUNITY
Start: 2019-04-01 | End: 2019-12-05

## 2019-05-31 NOTE — PROGRESS NOTES
Zainab PONCE [place] 78 y.o.  CC:Follow-up; Hypertension; MNG; and Hypokalemia    [place]: Follow-up; Hypertension; MNG; and Hypokalemia    bp is good   Is on low fat diet and altace  No statin   Has seen derm recently- biopsy left neck, skin cancer   Is on vitamin D supplement     Allergies   Allergen Reactions   • Latex Other (See Comments)     Redness from adhesive tape       Current Outpatient Medications:   •  amLODIPine (NORVASC) 5 MG tablet, Take 1 tablet by mouth Every Night., Disp: 90 tablet, Rfl: 3  •  aspirin (ASPIRIN LOW DOSE) 81 MG tablet, Take 1 tablet by mouth daily., Disp: , Rfl:   •  calcium citrate-vitamin d (CITRACAL PETITES/VITAMIN D) 200-250 MG-UNIT tablet tablet, Take 3 tablets by mouth daily., Disp: , Rfl:   •  Cholecalciferol (VITAMIN D3) 1000 UNITS capsule, Take 2 capsules by mouth daily., Disp: , Rfl:   •  clotrimazole-betamethasone (LOTRISONE) 1-0.05 % cream, , Disp: , Rfl: 1  •  imiquimod (ALDARA) 5 % cream, APPLY A TOOTHPICK AMOUNT AT NIGHT TO FACE TWICE WEEKLY FOR 16 WEEKS, Disp: , Rfl: 3  •  memantine (NAMENDA) 5 MG tablet, Take 1 tablet by mouth 2 (Two) Times a Day., Disp: 180 tablet, Rfl: 1  •  mesalamine (APRISO) 0.375 G 24 hr capsule, Take 1 capsule by mouth 2 (two) times a day., Disp: , Rfl:   •  Misc Natural Products (OSTEO BI-FLEX TRIPLE STRENGTH) tablet, Take  by mouth., Disp: , Rfl:   •  Multiple Vitamins-Minerals (CENTRUM SILVER) tablet, Take 1 tablet by mouth daily., Disp: , Rfl:   •  Multiple Vitamins-Minerals (HAIR FORMULA EXTRA STRENGTH PO), Take  by mouth., Disp: , Rfl:   •  Omega-3 Fatty Acids (FISH OIL) 1200 MG capsule capsule, Take 1 capsule by mouth daily., Disp: , Rfl:   •  ramipril (ALTACE) 10 MG capsule, Take 1 capsule by mouth Every 12 (Twelve) Hours., Disp: 180 capsule, Rfl: 3  •  Turmeric 450 MG capsule, Take  by mouth., Disp: , Rfl:   Patient Active Problem List    Diagnosis   • Ingrown right big toenail [L60.0]   • Mild cognitive impairment [G31.84]   • Vitamin D  deficiency [E55.9]   • Closed fracture of right ankle with delayed healing [S82.891G]   • Disorder of bone density and structure, unspecified [M85.9]   • Other adult osteomalacia  [M83.8]   • Acute cystitis without hematuria [N30.00]   • Benign essential hypertension [I10]   • Cardiac arrhythmia [I49.9]   • Cervical radiculopathy [M54.12]   • H/O colonoscopy [Z98.890]   • Migraine [G43.909]   • Gastroesophageal reflux disease [K21.9]   • Familial hypercholesterolemia [E78.01]   • Diffuse goiter [E04.9]   • Hypertension [I10]   • Hypokalemia [E87.6]   • Hyponatremia [E87.1]   • Knee pain [M25.569]   • Low back pain [M54.5]   • Abnormal mammogram [R92.8]   • Non-toxic multinodular goiter [E04.2]   • Sacroiliac joint somatic dysfunction [M99.04]   • Thrombocytopenia (CMS/HCC) [D69.6]   • Tick bite [W57.XXXA]   • Ulcerative colitis (CMS/HCC) [K51.90]   • Varicose veins [I83.90]   • Vasovagal syncope [R55]   • Candidiasis of vagina [B37.3]     Review of Systems   Constitutional: Negative for activity change, appetite change, chills, diaphoresis, fatigue, fever and unexpected weight change.   HENT: Negative for congestion, dental problem, drooling, ear discharge, ear pain, facial swelling, hearing loss, mouth sores, nosebleeds, postnasal drip, rhinorrhea, sinus pressure, sneezing, sore throat, tinnitus, trouble swallowing and voice change.    Eyes: Negative for photophobia, pain, discharge, redness, itching and visual disturbance.   Respiratory: Negative for apnea, cough, choking, chest tightness, shortness of breath, wheezing and stridor.    Cardiovascular: Negative for chest pain, palpitations and leg swelling.   Gastrointestinal: Negative for abdominal distention, abdominal pain, anal bleeding, blood in stool, constipation, diarrhea, nausea, rectal pain and vomiting.   Endocrine: Negative for cold intolerance, heat intolerance, polydipsia, polyphagia and polyuria.   Genitourinary: Negative for decreased urine volume,  "difficulty urinating, dysuria, enuresis, flank pain, frequency, genital sores, hematuria and urgency.   Musculoskeletal: Negative for arthralgias, back pain, gait problem, joint swelling, myalgias, neck pain and neck stiffness.   Skin: Negative for color change, pallor, rash and wound.   Allergic/Immunologic: Negative for environmental allergies, food allergies and immunocompromised state.   Neurological: Negative for dizziness, tremors, seizures, syncope, facial asymmetry, speech difficulty, weakness, light-headedness, numbness and headaches.   Hematological: Negative for adenopathy. Does not bruise/bleed easily.   Psychiatric/Behavioral: Negative for agitation, behavioral problems, confusion, decreased concentration, dysphoric mood, hallucinations, self-injury, sleep disturbance and suicidal ideas. The patient is not nervous/anxious and is not hyperactive.      Social History     Socioeconomic History   • Marital status:      Spouse name: Not on file   • Number of children: Not on file   • Years of education: Not on file   • Highest education level: Not on file   Tobacco Use   • Smoking status: Never Smoker   • Smokeless tobacco: Current User   Substance and Sexual Activity   • Alcohol use: No   • Drug use: No   • Sexual activity: Defer     Family History   Problem Relation Age of Onset   • Heart disease Mother    • Heart disease Father    • Cancer Father         colon   • Alcohol abuse Father    • Hypertension Sister    • Cancer Brother    • Heart disease Brother    • Breast cancer Neg Hx    • Ovarian cancer Neg Hx      /64   Pulse 58   Ht 154.9 cm (61\")   Wt 64.1 kg (141 lb 6.4 oz)   LMP  (LMP Unknown)   SpO2 97%   BMI 26.72 kg/m²   Physical Exam   Constitutional: She is oriented to person, place, and time. She appears well-developed and well-nourished.   HENT:   Head: Normocephalic and atraumatic.   Nose: Nose normal.   Mouth/Throat: Oropharynx is clear and moist.   Eyes: Conjunctivae, EOM " and lids are normal. Pupils are equal, round, and reactive to light.   Neck: Trachea normal and normal range of motion. Neck supple. Carotid bruit is not present. No tracheal deviation present. No thyroid mass and no thyromegaly present.   Cardiovascular: Normal rate, regular rhythm, normal heart sounds and intact distal pulses. Exam reveals no gallop and no friction rub.   No murmur heard.  Pulmonary/Chest: Effort normal and breath sounds normal. No respiratory distress. She has no wheezes.   Musculoskeletal: Normal range of motion. She exhibits edema (right leg > left ). She exhibits no deformity.   Lymphadenopathy:     She has no cervical adenopathy.   Neurological: She is alert and oriented to person, place, and time. She has normal reflexes. She displays normal reflexes. No cranial nerve deficit.   Skin: Skin is warm and dry. No rash noted. No cyanosis or erythema. Nails show no clubbing.   Psychiatric: She has a normal mood and affect. Her speech is normal and behavior is normal. Judgment and thought content normal. Cognition and memory are normal.   Nursing note and vitals reviewed.    Results for orders placed or performed in visit on 11/27/18   Comprehensive Metabolic Panel   Result Value Ref Range    Glucose 98 70 - 100 mg/dL    BUN 14 9 - 23 mg/dL    Creatinine 0.79 0.60 - 1.30 mg/dL    eGFR Non African Am 71 >60 mL/min/1.73    eGFR African Am 86 >60 mL/min/1.73    BUN/Creatinine Ratio 17.7 7.0 - 25.0    Sodium 138 132 - 146 mmol/L    Potassium 4.5 3.5 - 5.5 mmol/L    Chloride 106 99 - 109 mmol/L    Total CO2 24.0 20.0 - 31.0 mmol/L    Calcium 9.9 8.7 - 10.4 mg/dL    Total Protein 7.0 5.7 - 8.2 g/dL    Albumin 4.62 3.20 - 4.80 g/dL    Globulin 2.4 gm/dL    A/G Ratio 1.9 1.5 - 2.5 g/dL    Total Bilirubin 0.9 0.3 - 1.2 mg/dL    Alkaline Phosphatase 47 25 - 100 U/L    AST (SGOT) 21 0 - 33 U/L    ALT (SGPT) 23 7 - 40 U/L   Lipid Panel   Result Value Ref Range    Total Cholesterol 160 0 - 200 mg/dL     Triglycerides 94 0 - 150 mg/dL    HDL Cholesterol 47 40 - 60 mg/dL    VLDL Cholesterol 18.8 mg/dL    LDL Cholesterol  94 0 - 100 mg/dL   TSH   Result Value Ref Range    TSH 2.432 0.350 - 5.350 mIU/mL   Vitamin D 25 Hydroxy   Result Value Ref Range    25 Hydroxy, Vitamin D 67.0 ng/ml     Problem List Items Addressed This Visit        Cardiovascular and Mediastinum    Pure hypercholesterolemia     Check flp          Benign essential hypertension - Primary     bp is good   Continue to monitor          Relevant Orders    Comprehensive Metabolic Panel       Hematopoietic and Hemostatic    Thrombocytopenia (CMS/HCC)     Update cbc          Relevant Orders    CBC Auto Differential       Other    Hyponatremia     Check cmp              Return in about 6 months (around 11/30/2019) for Recheck 30 min .    Liberty Berry MA  Signed Cynthia Farmer MD

## 2019-06-01 LAB
ALBUMIN SERPL-MCNC: 4.7 G/DL (ref 3.5–5.2)
ALBUMIN/GLOB SERPL: 1.9 G/DL
ALP SERPL-CCNC: 49 U/L (ref 39–117)
ALT SERPL-CCNC: 13 U/L (ref 1–33)
AST SERPL-CCNC: 14 U/L (ref 1–32)
BASOPHILS # BLD AUTO: (no result) 10*3/UL
BASOPHILS # BLD MANUAL: 0.23 10*3/MM3 (ref 0–0.2)
BASOPHILS NFR BLD MANUAL: 4 % (ref 0–1.5)
BILIRUB SERPL-MCNC: 0.6 MG/DL (ref 0.2–1.2)
BUN SERPL-MCNC: 14 MG/DL (ref 8–23)
BUN/CREAT SERPL: 21.9 (ref 7–25)
CALCIUM SERPL-MCNC: 10.8 MG/DL (ref 8.6–10.5)
CHLORIDE SERPL-SCNC: 105 MMOL/L (ref 98–107)
CHOLEST SERPL-MCNC: 143 MG/DL (ref 0–200)
CO2 SERPL-SCNC: 24.9 MMOL/L (ref 22–29)
CREAT SERPL-MCNC: 0.64 MG/DL (ref 0.57–1)
DIFFERENTIAL COMMENT: ABNORMAL
EOSINOPHIL # BLD AUTO: (no result) 10*3/UL
EOSINOPHIL # BLD MANUAL: 0.06 10*3/MM3 (ref 0–0.4)
EOSINOPHIL NFR BLD AUTO: (no result) %
EOSINOPHIL NFR BLD MANUAL: 1 % (ref 0.3–6.2)
ERYTHROCYTE [DISTWIDTH] IN BLOOD BY AUTOMATED COUNT: 13.9 % (ref 12.3–15.4)
GLOBULIN SER CALC-MCNC: 2.5 GM/DL
GLUCOSE SERPL-MCNC: 108 MG/DL (ref 65–99)
HCT VFR BLD AUTO: 35.5 % (ref 34–46.6)
HDLC SERPL-MCNC: 46 MG/DL (ref 40–60)
HGB BLD-MCNC: 11.5 G/DL (ref 12–15.9)
LDLC SERPL CALC-MCNC: 81 MG/DL (ref 0–100)
LYMPHOCYTES # BLD AUTO: (no result) 10*3/UL
LYMPHOCYTES # BLD MANUAL: 1.26 10*3/MM3 (ref 0.7–3.1)
LYMPHOCYTES NFR BLD AUTO: (no result) %
LYMPHOCYTES NFR BLD MANUAL: 22.2 % (ref 19.6–45.3)
MCH RBC QN AUTO: 34.6 PG (ref 26.6–33)
MCHC RBC AUTO-ENTMCNC: 32.4 G/DL (ref 31.5–35.7)
MCV RBC AUTO: 106.9 FL (ref 79–97)
MONOCYTES # BLD MANUAL: 0.8 10*3/MM3 (ref 0.1–0.9)
MONOCYTES NFR BLD AUTO: (no result) %
MONOCYTES NFR BLD MANUAL: 14.1 % (ref 5–12)
NEUTROPHILS # BLD MANUAL: 3.32 10*3/MM3 (ref 1.7–7)
NEUTROPHILS NFR BLD AUTO: (no result) %
NEUTROPHILS NFR BLD MANUAL: 58.6 % (ref 42.7–76)
PLATELET # BLD AUTO: 120 10*3/MM3 (ref 140–450)
PLATELET BLD QL SMEAR: ABNORMAL
POTASSIUM SERPL-SCNC: 4.7 MMOL/L (ref 3.5–5.2)
PROT SERPL-MCNC: 7.2 G/DL (ref 6–8.5)
RBC # BLD AUTO: 3.32 10*6/MM3 (ref 3.77–5.28)
RBC MORPH BLD: ABNORMAL
SODIUM SERPL-SCNC: 138 MMOL/L (ref 136–145)
T4 FREE SERPL-MCNC: 1.24 NG/DL (ref 0.93–1.7)
TRIGL SERPL-MCNC: 79 MG/DL (ref 0–150)
TSH SERPL DL<=0.005 MIU/L-ACNC: 2.67 UIU/ML (ref 0.45–4.5)
VLDLC SERPL CALC-MCNC: 15.8 MG/DL
WBC # BLD AUTO: 5.67 10*3/MM3 (ref 3.4–10.8)

## 2019-06-07 DIAGNOSIS — I10 ESSENTIAL HYPERTENSION: ICD-10-CM

## 2019-06-08 DIAGNOSIS — I10 ESSENTIAL HYPERTENSION: ICD-10-CM

## 2019-06-08 RX ORDER — RAMIPRIL 10 MG/1
10 CAPSULE ORAL EVERY 12 HOURS
Qty: 180 CAPSULE | Refills: 3 | Status: SHIPPED | OUTPATIENT
Start: 2019-06-08 | End: 2020-06-03

## 2019-06-09 RX ORDER — MEMANTINE HYDROCHLORIDE 5 MG/1
5 TABLET ORAL 2 TIMES DAILY
Qty: 180 TABLET | Refills: 1 | OUTPATIENT
Start: 2019-06-09

## 2019-06-09 RX ORDER — AMLODIPINE BESYLATE 5 MG/1
5 TABLET ORAL NIGHTLY
Qty: 90 TABLET | Refills: 0 | Status: SHIPPED | OUTPATIENT
Start: 2019-06-09 | End: 2019-09-25 | Stop reason: SDUPTHER

## 2019-06-09 RX ORDER — RAMIPRIL 10 MG/1
10 CAPSULE ORAL EVERY 12 HOURS
Qty: 180 CAPSULE | Refills: 3 | OUTPATIENT
Start: 2019-06-09

## 2019-09-10 ENCOUNTER — TRANSCRIBE ORDERS (OUTPATIENT)
Dept: ADMINISTRATIVE | Facility: HOSPITAL | Age: 79
End: 2019-09-10

## 2019-09-10 DIAGNOSIS — Z12.31 VISIT FOR SCREENING MAMMOGRAM: Primary | ICD-10-CM

## 2019-09-25 DIAGNOSIS — I10 ESSENTIAL HYPERTENSION: ICD-10-CM

## 2019-09-25 RX ORDER — AMLODIPINE BESYLATE 5 MG/1
5 TABLET ORAL NIGHTLY
Qty: 90 TABLET | Refills: 0 | Status: SHIPPED | OUTPATIENT
Start: 2019-09-25 | End: 2019-12-05 | Stop reason: SDUPTHER

## 2019-10-30 ENCOUNTER — HOSPITAL ENCOUNTER (OUTPATIENT)
Dept: MAMMOGRAPHY | Facility: HOSPITAL | Age: 79
Discharge: HOME | End: 2019-10-30
Admitting: OBSTETRICS & GYNECOLOGY

## 2019-10-30 DIAGNOSIS — Z12.31 VISIT FOR SCREENING MAMMOGRAM: ICD-10-CM

## 2019-10-30 PROCEDURE — 77063 BREAST TOMOSYNTHESIS BI: CPT | Performed by: RADIOLOGY

## 2019-10-30 PROCEDURE — 77063 BREAST TOMOSYNTHESIS BI: CPT

## 2019-10-30 PROCEDURE — 77067 SCR MAMMO BI INCL CAD: CPT | Performed by: RADIOLOGY

## 2019-10-30 PROCEDURE — 77067 SCR MAMMO BI INCL CAD: CPT

## 2019-11-18 RX ORDER — MEMANTINE HYDROCHLORIDE 5 MG/1
5 TABLET ORAL 2 TIMES DAILY
Qty: 180 TABLET | Refills: 1 | Status: SHIPPED | OUTPATIENT
Start: 2019-11-18 | End: 2020-05-19

## 2019-12-05 ENCOUNTER — OFFICE VISIT (OUTPATIENT)
Dept: ENDOCRINOLOGY | Facility: CLINIC | Age: 79
End: 2019-12-05

## 2019-12-05 VITALS
DIASTOLIC BLOOD PRESSURE: 80 MMHG | BODY MASS INDEX: 25.47 KG/M2 | HEART RATE: 64 BPM | HEIGHT: 62 IN | SYSTOLIC BLOOD PRESSURE: 152 MMHG | OXYGEN SATURATION: 98 % | WEIGHT: 138.4 LBS

## 2019-12-05 DIAGNOSIS — E87.6 HYPOKALEMIA: ICD-10-CM

## 2019-12-05 DIAGNOSIS — E04.2 NON-TOXIC MULTINODULAR GOITER: ICD-10-CM

## 2019-12-05 DIAGNOSIS — I10 BENIGN ESSENTIAL HYPERTENSION: Primary | ICD-10-CM

## 2019-12-05 DIAGNOSIS — I10 ESSENTIAL HYPERTENSION: ICD-10-CM

## 2019-12-05 DIAGNOSIS — E55.9 VITAMIN D DEFICIENCY: ICD-10-CM

## 2019-12-05 DIAGNOSIS — R73.09 ELEVATED GLUCOSE: ICD-10-CM

## 2019-12-05 DIAGNOSIS — E83.52 HYPERCALCEMIA: ICD-10-CM

## 2019-12-05 DIAGNOSIS — E78.00 PURE HYPERCHOLESTEROLEMIA: ICD-10-CM

## 2019-12-05 PROCEDURE — 99214 OFFICE O/P EST MOD 30 MIN: CPT | Performed by: INTERNAL MEDICINE

## 2019-12-05 PROCEDURE — G0008 ADMIN INFLUENZA VIRUS VAC: HCPCS | Performed by: INTERNAL MEDICINE

## 2019-12-05 PROCEDURE — 90653 IIV ADJUVANT VACCINE IM: CPT | Performed by: INTERNAL MEDICINE

## 2019-12-05 RX ORDER — AMLODIPINE BESYLATE 5 MG/1
5 TABLET ORAL NIGHTLY
Qty: 90 TABLET | Refills: 3 | Status: SHIPPED | OUTPATIENT
Start: 2019-12-05 | End: 2020-06-05 | Stop reason: SDUPTHER

## 2019-12-05 NOTE — ASSESSMENT & PLAN NOTE
Update u/s   Has seen Dr Osacr in past- [facility] monitoring   Check tfts  H/o elevated ca- check D, ca and PTH

## 2019-12-05 NOTE — PROGRESS NOTES
Zainab [place] 79 y.o.  CC:Follow-up; Hypertension; MNG; and Hypokalemia    [place]: Follow-up; Hypertension; MNG; and Hypokalemia    bp is high- recheck 138/70  Known mng- saw Dr Oscar previously- update U/S  Low potassium - on ace inh   She cooked for thanksgiving   Due for lab update    Allergies   Allergen Reactions   • Latex Other (See Comments)     Redness from adhesive tape       Current Outpatient Medications:   •  amLODIPine (NORVASC) 5 MG tablet, Take 1 tablet by mouth Every Night., Disp: 90 tablet, Rfl: 3  •  aspirin (ASPIRIN LOW DOSE) 81 MG tablet, Take 1 tablet by mouth daily., Disp: , Rfl:   •  clotrimazole-betamethasone (LOTRISONE) 1-0.05 % cream, , Disp: , Rfl: 1  •  memantine (NAMENDA) 5 MG tablet, TAKE 1 TABLET BY MOUTH 2 (TWO) TIMES A DAY., Disp: 180 tablet, Rfl: 1  •  mesalamine (APRISO) 0.375 G 24 hr capsule, Take 1 capsule by mouth 2 (two) times a day., Disp: , Rfl:   •  Misc Natural Products (OSTEO BI-FLEX TRIPLE STRENGTH) tablet, Take  by mouth., Disp: , Rfl:   •  Multiple Vitamins-Minerals (CENTRUM SILVER) tablet, Take 1 tablet by mouth daily., Disp: , Rfl:   •  Multiple Vitamins-Minerals (HAIR FORMULA EXTRA STRENGTH PO), Take  by mouth., Disp: , Rfl:   •  Omega-3 Fatty Acids (FISH OIL) 1200 MG capsule capsule, Take 1 capsule by mouth daily., Disp: , Rfl:   •  ramipril (ALTACE) 10 MG capsule, TAKE 1 CAPSULE BY MOUTH EVERY 12 (TWELVE) HOURS., Disp: 180 capsule, Rfl: 3  •  Turmeric 450 MG capsule, Take  by mouth., Disp: , Rfl:   Patient Active Problem List    Diagnosis   • Hypercalcemia [E83.52]   • Elevated glucose [R73.09]   • Ingrown right big toenail [L60.0]   • Mild cognitive impairment [G31.84]   • Vitamin D deficiency [E55.9]   • Closed fracture of right ankle with delayed healing [S82.891G]   • Disorder of bone density and structure, unspecified [M85.9]   • Other adult osteomalacia  [M83.8]   • Acute cystitis without hematuria [N30.00]   • Benign essential hypertension [I10]   • Cardiac  arrhythmia [I49.9]   • Cervical radiculopathy [M54.12]   • H/O colonoscopy [Z98.890]   • Migraine [G43.909]   • Gastroesophageal reflux disease [K21.9]   • Pure hypercholesterolemia [E78.00]   • Diffuse goiter [E04.9]   • Hypertension [I10]   • Hypokalemia [E87.6]   • Hyponatremia [E87.1]   • Knee pain [M25.569]   • Low back pain [M54.5]   • Abnormal mammogram [R92.8]   • Non-toxic multinodular goiter [E04.2]   • Sacroiliac joint somatic dysfunction [M99.04]   • Thrombocytopenia (CMS/HCC) [D69.6]   • Tick bite [W57.XXXA]   • Ulcerative colitis (CMS/HCC) [K51.90]   • Varicose veins [I83.90]   • Vasovagal syncope [R55]   • Candidiasis of vagina [B37.3]     Review of Systems   Constitutional: Negative for activity change, appetite change, chills, diaphoresis, fatigue, fever and unexpected weight change.   HENT: Negative for congestion, dental problem, drooling, ear discharge, ear pain, facial swelling, hearing loss, mouth sores, nosebleeds, postnasal drip, rhinorrhea, sinus pressure, sneezing, sore throat, tinnitus, trouble swallowing and voice change.    Eyes: Negative for photophobia, pain, discharge, redness, itching and visual disturbance.   Respiratory: Negative for apnea, cough, choking, chest tightness, shortness of breath, wheezing and stridor.    Cardiovascular: Negative for chest pain, palpitations and leg swelling.   Gastrointestinal: Negative for abdominal distention, abdominal pain, anal bleeding, blood in stool, constipation, diarrhea, nausea, rectal pain and vomiting.   Endocrine: Negative for cold intolerance, heat intolerance, polydipsia, polyphagia and polyuria.   Genitourinary: Negative for decreased urine volume, difficulty urinating, dysuria, enuresis, flank pain, frequency, genital sores, hematuria and urgency.   Musculoskeletal: Positive for arthralgias. Negative for back pain, gait problem, joint swelling, myalgias, neck pain and neck stiffness.   Skin: Negative for color change, pallor, rash  "and wound.   Allergic/Immunologic: Negative for environmental allergies, food allergies and immunocompromised state.   Neurological: Negative for dizziness, tremors, seizures, syncope, facial asymmetry, speech difficulty, weakness, light-headedness, numbness and headaches.   Hematological: Negative for adenopathy. Does not bruise/bleed easily.   Psychiatric/Behavioral: Positive for decreased concentration. Negative for agitation, behavioral problems, confusion, dysphoric mood, hallucinations, self-injury, sleep disturbance and suicidal ideas. The patient is not nervous/anxious and is not hyperactive.      Social History     Socioeconomic History   • Marital status:      Spouse name: Not on file   • Number of children: Not on file   • Years of education: Not on file   • Highest education level: Not on file   Tobacco Use   • Smoking status: Never Smoker   • Smokeless tobacco: Current User   Substance and Sexual Activity   • Alcohol use: No   • Drug use: No   • Sexual activity: Defer     Family History   Problem Relation Age of Onset   • Heart disease Mother    • Heart disease Father    • Cancer Father         colon   • Alcohol abuse Father    • Hypertension Sister    • Cancer Brother    • Heart disease Brother    • Breast cancer Neg Hx    • Ovarian cancer Neg Hx      /80   Pulse 64   Ht 156.8 cm (61.75\")   Wt 62.8 kg (138 lb 6.4 oz)   LMP  (LMP Unknown)   SpO2 98%   BMI 25.52 kg/m²   Physical Exam   Constitutional: She is oriented to person, place, and time. She appears well-developed and well-nourished.   HENT:   Head: Normocephalic and atraumatic.   Nose: Nose normal.   Mouth/Throat: Oropharynx is clear and moist.   Eyes: Conjunctivae, EOM and lids are normal. Pupils are equal, round, and reactive to light.   Neck: Trachea normal and normal range of motion. Neck supple. Carotid bruit is not present. No tracheal deviation present. Thyromegaly (at tip of sternal notch- rest is substernal ) present. " No thyroid mass present.   Cardiovascular: Normal rate, regular rhythm and intact distal pulses. Exam reveals no gallop and no friction rub.   Murmur heard.  Pulmonary/Chest: Effort normal and breath sounds normal. No respiratory distress. She has no wheezes.   Musculoskeletal: Normal range of motion. She exhibits no edema or deformity.   Lymphadenopathy:     She has no cervical adenopathy.   Neurological: She is alert and oriented to person, place, and time. She has normal reflexes. She displays normal reflexes. No cranial nerve deficit.   Skin: Skin is warm and dry. No rash noted. No cyanosis or erythema. Nails show no clubbing.   Psychiatric: She has a normal mood and affect. Her speech is normal and behavior is normal. Judgment and thought content normal. Cognition and memory are normal.   Nursing note and vitals reviewed.    Results for orders placed or performed in visit on 05/31/19   Comprehensive Metabolic Panel   Result Value Ref Range    Glucose 108 (H) 65 - 99 mg/dL    BUN 14 8 - 23 mg/dL    Creatinine 0.64 0.57 - 1.00 mg/dL    eGFR Non African Am 90 >60 mL/min/1.73    eGFR African Am 109 >60 mL/min/1.73    BUN/Creatinine Ratio 21.9 7.0 - 25.0    Sodium 138 136 - 145 mmol/L    Potassium 4.7 3.5 - 5.2 mmol/L    Chloride 105 98 - 107 mmol/L    Total CO2 24.9 22.0 - 29.0 mmol/L    Calcium 10.8 (H) 8.6 - 10.5 mg/dL    Total Protein 7.2 6.0 - 8.5 g/dL    Albumin 4.70 3.50 - 5.20 g/dL    Globulin 2.5 gm/dL    A/G Ratio 1.9 g/dL    Total Bilirubin 0.6 0.2 - 1.2 mg/dL    Alkaline Phosphatase 49 39 - 117 U/L    AST (SGOT) 14 1 - 32 U/L    ALT (SGPT) 13 1 - 33 U/L   Lipid Panel   Result Value Ref Range    Total Cholesterol 143 0 - 200 mg/dL    Triglycerides 79 0 - 150 mg/dL    HDL Cholesterol 46 40 - 60 mg/dL    VLDL Cholesterol 15.8 mg/dL    LDL Cholesterol  81 0 - 100 mg/dL   T4, Free   Result Value Ref Range    Free T4 1.24 0.93 - 1.70 ng/dL   TSH   Result Value Ref Range    TSH 2.670 0.450 - 4.500 uIU/mL    Manual Differential   Result Value Ref Range    Neutrophil Rel % 58.6 42.7 - 76.0 %    Lymphocyte Rel % 22.2 19.6 - 45.3 %    Monocyte Rel % 14.1 (H) 5.0 - 12.0 %    Eosinophil Rel % 1.0 0.3 - 6.2 %    Basophil Rel % 4.0 (H) 0.0 - 1.5 %    Neutrophils Absolute 3.32 1.70 - 7.00 10*3/mm3    Lymphocytes Absolute 1.26 0.70 - 3.10 10*3/mm3    Monocytes Absolute 0.80 0.10 - 0.90 10*3/mm3    Eosinophil Abs 0.06 0.00 - 0.40 10*3/mm3    Basophils Absolute 0.23 (H) 0.00 - 0.20 10*3/mm3    Differential Comment Comment     Comment Comment     Plt Comment Comment    CBC & Differential   Result Value Ref Range    WBC 5.67 3.40 - 10.80 10*3/mm3    RBC 3.32 (L) 3.77 - 5.28 10*6/mm3    Hemoglobin 11.5 (L) 12.0 - 15.9 g/dL    Hematocrit 35.5 34.0 - 46.6 %    .9 (H) 79.0 - 97.0 fL    MCH 34.6 (H) 26.6 - 33.0 pg    MCHC 32.4 31.5 - 35.7 g/dL    RDW 13.9 12.3 - 15.4 %    Platelets 120 (L) 140 - 450 10*3/mm3    Neutrophil Rel % CANCELED     Lymphocyte Rel % CANCELED     Monocyte Rel % CANCELED     Eosinophil Rel % CANCELED     Lymphocytes Absolute CANCELED     Eosinophils Absolute CANCELED     Basophils Absolute CANCELED      Zainab was seen today for follow-up, hypertension, mng and hypokalemia.    Diagnoses and all orders for this visit:    Benign essential hypertension    Essential hypertension  -     amLODIPine (NORVASC) 5 MG tablet; Take 1 tablet by mouth Every Night.  -     Comprehensive Metabolic Panel    Pure hypercholesterolemia  -     Lipid Panel    Vitamin D deficiency  -     Vitamin D 25 Hydroxy    Non-toxic multinodular goiter  -     TSH  -     T4, Free  -     US Thyroid    Hypokalemia    Hypercalcemia  -     PTH, Intact  -     Calcium, Ionized    Elevated glucose  -     Hemoglobin A1c    Other orders  -     Fluad Quad >65 years ([id number])      Problem List Items Addressed This Visit        Cardiovascular and Mediastinum    Pure hypercholesterolemia     Is on low fat diet- check flp          Relevant Orders    Lipid  Panel    Hypertension     See above          Relevant Medications    amLODIPine (NORVASC) 5 MG tablet    Other Relevant Orders    Comprehensive Metabolic Panel    Benign essential hypertension - Primary     High bp - recheck normal.           Relevant Medications    amLODIPine (NORVASC) 5 MG tablet       Digestive    Vitamin D deficiency     Is on vitamin D supplement          Relevant Orders    Vitamin D 25 Hydroxy       Endocrine    Non-toxic multinodular goiter     Update u/s   Has seen Dr Oscar in past- [facility] monitoring   Check tfts  H/o elevated ca- check D, ca and PTH          Relevant Orders    TSH    T4, Free    US Thyroid       Other    Hypokalemia     Check cmp          Hypercalcemia     Check ion ca, pth and vitamin D          Relevant Orders    PTH, Intact    Calcium, Ionized    Elevated glucose     Blood sugar is high- check a1c          Relevant Orders    Hemoglobin A1c        Return in about 6 months (around 6/5/2020) for Recheck 30 min .    Cynthia Farmer MD  Signed Cynthia Farmer MD

## 2019-12-06 LAB
25(OH)D3+25(OH)D2 SERPL-MCNC: 43.5 NG/ML (ref 30–100)
ALBUMIN SERPL-MCNC: 4.8 G/DL (ref 3.5–5.2)
ALBUMIN/GLOB SERPL: 2 G/DL
ALP SERPL-CCNC: 60 U/L (ref 39–117)
ALT SERPL-CCNC: 12 U/L (ref 1–33)
AST SERPL-CCNC: 18 U/L (ref 1–32)
BILIRUB SERPL-MCNC: 0.9 MG/DL (ref 0.2–1.2)
BUN SERPL-MCNC: 14 MG/DL (ref 8–23)
BUN/CREAT SERPL: 17.3 (ref 7–25)
CA-I SERPL ISE-MCNC: 5.6 MG/DL (ref 4.5–5.6)
CALCIUM SERPL-MCNC: 9.8 MG/DL (ref 8.6–10.5)
CHLORIDE SERPL-SCNC: 102 MMOL/L (ref 98–107)
CHOLEST SERPL-MCNC: 154 MG/DL (ref 0–200)
CO2 SERPL-SCNC: 25.4 MMOL/L (ref 22–29)
CREAT SERPL-MCNC: 0.81 MG/DL (ref 0.57–1)
GLOBULIN SER CALC-MCNC: 2.4 GM/DL
GLUCOSE SERPL-MCNC: 104 MG/DL (ref 65–99)
HBA1C MFR BLD: 6.1 % (ref 4.8–5.6)
HDLC SERPL-MCNC: 46 MG/DL (ref 40–60)
LDLC SERPL CALC-MCNC: 93 MG/DL (ref 0–100)
POTASSIUM SERPL-SCNC: 4.6 MMOL/L (ref 3.5–5.2)
PROT SERPL-MCNC: 7.2 G/DL (ref 6–8.5)
PTH-INTACT SERPL-MCNC: 38 PG/ML (ref 15–65)
SODIUM SERPL-SCNC: 140 MMOL/L (ref 136–145)
T4 FREE SERPL-MCNC: 1.24 NG/DL (ref 0.93–1.7)
TRIGL SERPL-MCNC: 77 MG/DL (ref 0–150)
TSH SERPL DL<=0.005 MIU/L-ACNC: 3.02 UIU/ML (ref 0.27–4.2)
VLDLC SERPL CALC-MCNC: 15.4 MG/DL

## 2020-01-16 ENCOUNTER — HOSPITAL ENCOUNTER (OUTPATIENT)
Dept: ULTRASOUND IMAGING | Facility: HOSPITAL | Age: 80
Discharge: HOME | End: 2020-01-16
Admitting: INTERNAL MEDICINE

## 2020-01-16 PROCEDURE — 76536 US EXAM OF HEAD AND NECK: CPT

## 2020-05-19 RX ORDER — MEMANTINE HYDROCHLORIDE 5 MG/1
5 TABLET ORAL 2 TIMES DAILY
Qty: 180 TABLET | Refills: 0 | Status: SHIPPED | OUTPATIENT
Start: 2020-05-19 | End: 2020-06-05 | Stop reason: SDUPTHER

## 2020-06-02 DIAGNOSIS — I10 ESSENTIAL HYPERTENSION: ICD-10-CM

## 2020-06-03 RX ORDER — RAMIPRIL 10 MG/1
10 CAPSULE ORAL EVERY 12 HOURS
Qty: 180 CAPSULE | Refills: 0 | Status: SHIPPED | OUTPATIENT
Start: 2020-06-03 | End: 2020-06-05 | Stop reason: SDUPTHER

## 2020-06-05 ENCOUNTER — LAB REQUISITION (OUTPATIENT)
Dept: LAB | Facility: HOSPITAL | Age: 80
End: 2020-06-05

## 2020-06-05 ENCOUNTER — OFFICE VISIT (OUTPATIENT)
Dept: ENDOCRINOLOGY | Facility: CLINIC | Age: 80
End: 2020-06-05

## 2020-06-05 VITALS
SYSTOLIC BLOOD PRESSURE: 122 MMHG | HEART RATE: 60 BPM | WEIGHT: 139.2 LBS | OXYGEN SATURATION: 97 % | DIASTOLIC BLOOD PRESSURE: 60 MMHG | BODY MASS INDEX: 25.67 KG/M2

## 2020-06-05 DIAGNOSIS — R73.03 PREDIABETES: Primary | ICD-10-CM

## 2020-06-05 DIAGNOSIS — Z00.00 ROUTINE GENERAL MEDICAL EXAMINATION AT A HEALTH CARE FACILITY: ICD-10-CM

## 2020-06-05 DIAGNOSIS — I10 ESSENTIAL HYPERTENSION: ICD-10-CM

## 2020-06-05 DIAGNOSIS — E78.00 PURE HYPERCHOLESTEROLEMIA: ICD-10-CM

## 2020-06-05 PROCEDURE — 36415 COLL VENOUS BLD VENIPUNCTURE: CPT

## 2020-06-05 PROCEDURE — 99214 OFFICE O/P EST MOD 30 MIN: CPT | Performed by: INTERNAL MEDICINE

## 2020-06-05 RX ORDER — MEMANTINE HYDROCHLORIDE 5 MG/1
5 TABLET ORAL 2 TIMES DAILY
Qty: 180 TABLET | Refills: 3 | Status: SHIPPED | OUTPATIENT
Start: 2020-06-05 | End: 2021-07-22

## 2020-06-05 RX ORDER — AMLODIPINE BESYLATE 5 MG/1
5 TABLET ORAL NIGHTLY
Qty: 90 TABLET | Refills: 3 | Status: SHIPPED | OUTPATIENT
Start: 2020-06-05 | End: 2021-08-30

## 2020-06-05 RX ORDER — RAMIPRIL 10 MG/1
10 CAPSULE ORAL EVERY 12 HOURS
Qty: 180 CAPSULE | Refills: 3 | Status: SHIPPED | OUTPATIENT
Start: 2020-06-05 | End: 2021-08-26

## 2020-06-05 NOTE — ASSESSMENT & PLAN NOTE
Blood sugar average from last ov reviewed  Update a1c today   Has been exercising less and is home more

## 2020-06-05 NOTE — PROGRESS NOTES
Zainab ROSARIO [place] 79 y.o.  CC:Follow-up; Hypertension; Hyperlipidemia; MNG; and Hypokalemia      [place]: Follow-up; Hypertension; Hyperlipidemia; MNG; and Hypokalemia  also predm with a1c 6.1% at last visit     Is on norvasc 5 mg daily and altace 10 mg twice daily   Cc is OA hands and feet  Walking less   Is trying to walk outside  Discussed shingles shots   Discussed updating pneumovax    Allergies   Allergen Reactions   • Latex Other (See Comments)     Redness from adhesive tape       Current Outpatient Medications:   •  amLODIPine (NORVASC) 5 MG tablet, Take 1 tablet by mouth Every Night., Disp: 90 tablet, Rfl: 3  •  aspirin (ASPIRIN LOW DOSE) 81 MG tablet, Take 1 tablet by mouth daily., Disp: , Rfl:   •  clotrimazole-betamethasone (LOTRISONE) 1-0.05 % cream, , Disp: , Rfl: 1  •  memantine (NAMENDA) 5 MG tablet, Take 1 tablet by mouth 2 (Two) Times a Day., Disp: 180 tablet, Rfl: 3  •  mesalamine (APRISO) 0.375 G 24 hr capsule, Take 1 capsule by mouth 2 (two) times a day., Disp: , Rfl:   •  Misc Natural Products (OSTEO BI-FLEX TRIPLE STRENGTH) tablet, Take  by mouth., Disp: , Rfl:   •  Multiple Vitamins-Minerals (CENTRUM SILVER) tablet, Take 1 tablet by mouth daily., Disp: , Rfl:   •  Multiple Vitamins-Minerals (HAIR FORMULA EXTRA STRENGTH PO), Take  by mouth., Disp: , Rfl:   •  Omega-3 Fatty Acids (FISH OIL) 1200 MG capsule capsule, Take 1 capsule by mouth daily., Disp: , Rfl:   •  ramipril (ALTACE) 10 MG capsule, Take 1 capsule by mouth Every 12 (Twelve) Hours., Disp: 180 capsule, Rfl: 3  Patient Active Problem List    Diagnosis   • Hypercalcemia [E83.52]   • Elevated glucose [R73.09]   • Ingrown right big toenail [L60.0]   • Mild cognitive impairment [G31.84]   • Vitamin D deficiency [E55.9]   • Closed fracture of right ankle with delayed healing [S82.891G]   • Disorder of bone density and structure, unspecified [M85.9]   • Other adult osteomalacia  [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6]   • Tick bite [W57.XXXA]   • Ulcerative colitis (CMS/HCC) [K51.90]   • Varicose veins [I83.90]   • Vasovagal syncope [R55]   • Candidiasis of vagina [B37.3]     Review of Systems   Constitutional: Negative for activity change, appetite change, chills, diaphoresis, fatigue, fever and unexpected weight change.   HENT: Negative for congestion, dental problem, drooling, ear discharge, ear pain, facial swelling, hearing loss, mouth sores, nosebleeds, postnasal drip, rhinorrhea, sinus pressure, sneezing, sore throat, tinnitus, trouble swallowing and voice change.    Eyes: Negative for photophobia, pain, discharge, redness, itching and visual disturbance.   Respiratory: Negative for apnea, cough, choking, chest tightness, shortness of breath, wheezing and stridor.    Cardiovascular: Negative for chest pain, palpitations and leg swelling.   Gastrointestinal: Negative for abdominal distention, abdominal pain, anal bleeding, blood in stool, constipation, diarrhea, nausea, rectal pain and vomiting.   Endocrine: Negative for cold intolerance, heat intolerance, polydipsia, polyphagia and polyuria.   Genitourinary: Negative for decreased urine volume, difficulty urinating, dysuria, enuresis, flank pain, frequency, genital sores, hematuria and urgency.   Musculoskeletal: Positive for arthralgias. Negative for back pain, gait problem, joint swelling, myalgias, neck pain and neck stiffness.   Skin:  Negative for color change, pallor, rash and wound.   Allergic/Immunologic: Negative for environmental allergies, food allergies and immunocompromised state.   Neurological: Negative for dizziness, tremors, seizures, syncope, facial asymmetry, speech difficulty, weakness, light-headedness, numbness and headaches.   Hematological: Negative for adenopathy. Does not bruise/bleed easily.   Psychiatric/Behavioral: Negative for agitation, behavioral problems, confusion, decreased concentration, dysphoric mood, hallucinations, self-injury, sleep disturbance and suicidal ideas. The patient is not nervous/anxious and is not hyperactive.      Social History     Socioeconomic History   • Marital status:      Spouse name: Not on file   • Number of children: Not on file   • Years of education: Not on file   • Highest education level: Not on file   Tobacco Use   • Smoking status: Never Smoker   • Smokeless tobacco: Current User   Substance and Sexual Activity   • Alcohol use: No   • Drug use: No   • Sexual activity: Defer     Family History   Problem Relation Age of Onset   • Heart disease Mother    • Heart disease Father    • Cancer Father         colon   • Alcohol abuse Father    • Hypertension Sister    • Cancer Brother    • Heart disease Brother    • Breast cancer Neg Hx    • Ovarian cancer Neg Hx      /60   Pulse 60   Wt 63.1 kg (139 lb 3.2 oz)   LMP  (LMP Unknown)   SpO2 97%   BMI 25.67 kg/m²   Physical Exam   Constitutional: She is oriented to person, place, and time. She appears well-developed and well-nourished.   HENT:   Head: Normocephalic and atraumatic.   Nose: Nose normal.   Mouth/Throat: Oropharynx is clear and moist.   Eyes: Pupils are equal, round, and reactive to light. Conjunctivae, EOM and lids are normal.   Neck: Trachea normal and normal range of motion. Neck supple. Carotid bruit is not present. No tracheal deviation present. No thyroid mass and no thyromegaly present.   Cardiovascular:  Normal rate, regular rhythm, normal heart sounds and intact distal pulses. Exam reveals no gallop and no friction rub.   No murmur heard.  Pulmonary/Chest: Effort normal and breath sounds normal. No respiratory distress. She has no wheezes.   Musculoskeletal: Normal range of motion. She exhibits no edema or deformity.   arthralgia   Lymphadenopathy:     She has no cervical adenopathy.   Neurological: She is alert and oriented to person, place, and time. She has normal reflexes. She displays normal reflexes. No cranial nerve deficit.   Skin: Skin is warm and dry. No rash noted. No cyanosis or erythema. Nails show no clubbing.   Psychiatric: She has a normal mood and affect. Her speech is normal and behavior is normal. Judgment and thought content normal. Cognition and memory are normal.   Nursing note and vitals reviewed.    Results for orders placed or performed in visit on 12/05/19   Comprehensive Metabolic Panel   Result Value Ref Range    Glucose 104 (H) 65 - 99 mg/dL    BUN 14 8 - 23 mg/dL    Creatinine 0.81 0.57 - 1.00 mg/dL    eGFR Non African Am 69 >60 mL/min/1.73    eGFR African Am 83 >60 mL/min/1.73    BUN/Creatinine Ratio 17.3 7.0 - 25.0    Sodium 140 136 - 145 mmol/L    Potassium 4.6 3.5 - 5.2 mmol/L    Chloride 102 98 - 107 mmol/L    Total CO2 25.4 22.0 - 29.0 mmol/L    Calcium 9.8 8.6 - 10.5 mg/dL    Total Protein 7.2 6.0 - 8.5 g/dL    Albumin 4.80 3.50 - 5.20 g/dL    Globulin 2.4 gm/dL    A/G Ratio 2.0 g/dL    Total Bilirubin 0.9 0.2 - 1.2 mg/dL    Alkaline Phosphatase 60 39 - 117 U/L    AST (SGOT) 18 1 - 32 U/L    ALT (SGPT) 12 1 - 33 U/L   Lipid Panel   Result Value Ref Range    Total Cholesterol 154 0 - 200 mg/dL    Triglycerides 77 0 - 150 mg/dL    HDL Cholesterol 46 40 - 60 mg/dL    VLDL Cholesterol 15.4 mg/dL    LDL Cholesterol  93 0 - 100 mg/dL   TSH   Result Value Ref Range    TSH 3.020 0.270 - 4.200 uIU/mL   T4, Free   Result Value Ref Range    Free T4 1.24 0.93 - 1.70 ng/dL   Hemoglobin A1c    Result Value Ref Range    Hemoglobin A1C 6.10 (H) 4.80 - 5.60 %   Vitamin D 25 Hydroxy   Result Value Ref Range    25 Hydroxy, Vitamin D 43.5 30.0 - 100.0 ng/ml   PTH, Intact   Result Value Ref Range    PTH, Intact 38 15 - 65 pg/mL   Calcium, Ionized   Result Value Ref Range    Ionized Calcium 5.6 4.5 - 5.6 mg/dL     Zainab was seen today for follow-up, hypertension, hyperlipidemia, mng and hypokalemia.    Diagnoses and all orders for this visit:    Essential hypertension  -     amLODIPine (NORVASC) 5 MG tablet; Take 1 tablet by mouth Every Night.  -     ramipril (ALTACE) 10 MG capsule; Take 1 capsule by mouth Every 12 (Twelve) Hours.    Other orders  -     memantine (NAMENDA) 5 MG tablet; Take 1 tablet by mouth 2 (Two) Times a Day.      Problem List Items Addressed This Visit        Cardiovascular and Mediastinum    Pure hypercholesterolemia     Is on low fat diet and fish oil  Continue monitoring and medication          Relevant Orders    Lipid Panel    Hypertension     bp is good   Is on altace and amlodipine   Continue monitoring and medication          Relevant Medications    amLODIPine (NORVASC) 5 MG tablet    ramipril (ALTACE) 10 MG capsule       Endocrine    Prediabetes - Primary     Blood sugar average from last ov reviewed  Update a1c today   Has been exercising less and is home more          Relevant Orders    Comprehensive Metabolic Panel    Lipid Panel    TSH    Hemoglobin A1c    Hepatitis C Antibody        Return in about 6 months (around 12/5/2020) for Recheck.    Liberty Berry MA  Signed Cynthia Farmer MD

## 2020-06-06 LAB
ALBUMIN SERPL-MCNC: 4.7 G/DL (ref 3.5–5.2)
ALBUMIN/GLOB SERPL: 1.8 G/DL
ALP SERPL-CCNC: 47 U/L (ref 39–117)
ALT SERPL-CCNC: 14 U/L (ref 1–33)
AST SERPL-CCNC: 12 U/L (ref 1–32)
BILIRUB SERPL-MCNC: 0.9 MG/DL (ref 0.2–1.2)
BUN SERPL-MCNC: 19 MG/DL (ref 8–23)
BUN/CREAT SERPL: 23.8 (ref 7–25)
CALCIUM SERPL-MCNC: 10.2 MG/DL (ref 8.6–10.5)
CHLORIDE SERPL-SCNC: 104 MMOL/L (ref 98–107)
CHOLEST SERPL-MCNC: 138 MG/DL (ref 0–200)
CO2 SERPL-SCNC: 24.8 MMOL/L (ref 22–29)
CREAT SERPL-MCNC: 0.8 MG/DL (ref 0.57–1)
GLOBULIN SER CALC-MCNC: 2.6 GM/DL
GLUCOSE SERPL-MCNC: 109 MG/DL (ref 65–99)
HBA1C MFR BLD: 5.9 % (ref 4.8–5.6)
HCV AB S/CO SERPL IA: <0.1 S/CO RATIO (ref 0–0.9)
HDLC SERPL-MCNC: 46 MG/DL (ref 40–60)
LDLC SERPL CALC-MCNC: 79 MG/DL (ref 0–100)
POTASSIUM SERPL-SCNC: 4.7 MMOL/L (ref 3.5–5.2)
PROT SERPL-MCNC: 7.3 G/DL (ref 6–8.5)
SODIUM SERPL-SCNC: 137 MMOL/L (ref 136–145)
TRIGL SERPL-MCNC: 66 MG/DL (ref 0–150)
TSH SERPL DL<=0.005 MIU/L-ACNC: 2.44 UIU/ML (ref 0.27–4.2)
VLDLC SERPL CALC-MCNC: 13.2 MG/DL

## 2020-09-21 ENCOUNTER — TRANSCRIBE ORDERS (OUTPATIENT)
Dept: ADMINISTRATIVE | Facility: HOSPITAL | Age: 80
End: 2020-09-21

## 2020-09-21 DIAGNOSIS — Z12.31 VISIT FOR SCREENING MAMMOGRAM: Primary | ICD-10-CM

## 2020-12-15 ENCOUNTER — OFFICE VISIT (OUTPATIENT)
Dept: ENDOCRINOLOGY | Facility: CLINIC | Age: 80
End: 2020-12-15

## 2020-12-15 ENCOUNTER — LAB REQUISITION (OUTPATIENT)
Dept: LAB | Facility: HOSPITAL | Age: 80
End: 2020-12-15

## 2020-12-15 VITALS
HEIGHT: 61 IN | DIASTOLIC BLOOD PRESSURE: 72 MMHG | SYSTOLIC BLOOD PRESSURE: 128 MMHG | OXYGEN SATURATION: 98 % | WEIGHT: 139.8 LBS | BODY MASS INDEX: 26.39 KG/M2 | HEART RATE: 64 BPM

## 2020-12-15 DIAGNOSIS — E78.00 PURE HYPERCHOLESTEROLEMIA: ICD-10-CM

## 2020-12-15 DIAGNOSIS — E55.9 VITAMIN D DEFICIENCY: ICD-10-CM

## 2020-12-15 DIAGNOSIS — Z00.00 ROUTINE GENERAL MEDICAL EXAMINATION AT A HEALTH CARE FACILITY: ICD-10-CM

## 2020-12-15 DIAGNOSIS — R73.03 PREDIABETES: ICD-10-CM

## 2020-12-15 DIAGNOSIS — I10 BENIGN ESSENTIAL HYPERTENSION: Primary | ICD-10-CM

## 2020-12-15 DIAGNOSIS — E04.2 NON-TOXIC MULTINODULAR GOITER: ICD-10-CM

## 2020-12-15 PROCEDURE — 36415 COLL VENOUS BLD VENIPUNCTURE: CPT | Performed by: INTERNAL MEDICINE

## 2020-12-15 PROCEDURE — 99214 OFFICE O/P EST MOD 30 MIN: CPT | Performed by: INTERNAL MEDICINE

## 2020-12-15 RX ORDER — INFLUENZA A VIRUS A/MICHIGAN/45/2015 X-275 (H1N1) ANTIGEN (FORMALDEHYDE INACTIVATED), INFLUENZA A VIRUS A/SINGAPORE/INFIMH-16-0019/2016 IVR-186 (H3N2) ANTIGEN (FORMALDEHYDE INACTIVATED), INFLUENZA B VIRUS B/PHUKET/3073/2013 ANTIGEN (FORMALDEHYDE INACTIVATED), AND INFLUENZA B VIRUS B/MARYLAND/15/2016 BX-69A ANTIGEN (FORMALDEHYDE INACTIVATED) 60; 60; 60; 60 UG/.7ML; UG/.7ML; UG/.7ML; UG/.7ML
INJECTION, SUSPENSION INTRAMUSCULAR
COMMUNITY
Start: 2020-11-20 | End: 2022-08-24

## 2020-12-15 RX ORDER — FLUTICASONE PROPIONATE 50 MCG
SPRAY, SUSPENSION (ML) NASAL
COMMUNITY
Start: 2020-10-19

## 2020-12-15 NOTE — ASSESSMENT & PLAN NOTE
Has seen surgery in past  No symptoms currently   Clinical exam is stable  Continue with monitoring

## 2020-12-15 NOTE — PROGRESS NOTES
Zainab [place] 80 y.o.  CC:Follow-up, Hypertension, Hyperlipidemia, MNG, and Hypokalemia      [place]: Follow-up, Hypertension, Hyperlipidemia, MNG, and Hypokalemia    bp is good  Is on altace 10 mg bid and norvasc  Is on low fat diet   Having more problems with arthritis hands and feet  Does not take medication for it   Using cold compresses on ankles at night   mng without dysphagia or hoarseness  Right hip stiff when she sits too long   Last ov high blood sugar and a1c in predm range  No leg cramps or pain   Using arch supports and specialty shoes   Recommended update pneumovax and shingles vaccines    Allergies   Allergen Reactions   • Latex Other (See Comments)     Redness from adhesive tape       Current Outpatient Medications:   •  amLODIPine (NORVASC) 5 MG tablet, Take 1 tablet by mouth Every Night., Disp: 90 tablet, Rfl: 3  •  aspirin (ASPIRIN LOW DOSE) 81 MG tablet, Take 1 tablet by mouth daily., Disp: , Rfl:   •  clotrimazole-betamethasone (LOTRISONE) 1-0.05 % cream, , Disp: , Rfl: 1  •  memantine (NAMENDA) 5 MG tablet, Take 1 tablet by mouth 2 (Two) Times a Day., Disp: 180 tablet, Rfl: 3  •  mesalamine (APRISO) 0.375 G 24 hr capsule, Take 1 capsule by mouth 2 (two) times a day., Disp: , Rfl:   •  Misc Natural Products (OSTEO BI-FLEX TRIPLE STRENGTH) tablet, Take  by mouth., Disp: , Rfl:   •  Multiple Vitamins-Minerals (CENTRUM SILVER) tablet, Take 1 tablet by mouth daily., Disp: , Rfl:   •  Multiple Vitamins-Minerals (HAIR FORMULA EXTRA STRENGTH PO), Take  by mouth., Disp: , Rfl:   •  Omega-3 Fatty Acids (FISH OIL) 1200 MG capsule capsule, Take 1 capsule by mouth daily., Disp: , Rfl:   •  ramipril (ALTACE) 10 MG capsule, Take 1 capsule by mouth Every 12 (Twelve) Hours., Disp: 180 capsule, Rfl: 3  Patient Active Problem List    Diagnosis   • Prediabetes [R73.03]   • Hypercalcemia [E83.52]   • Elevated glucose [R73.09]   • Ingrown right big toenail [L60.0]   • Mild cognitive impairment [G31.84]   • Vitamin D  deficiency [E55.9]   • Closed fracture of right ankle with delayed healing [S82.891G]   • Disorder of bone density and structure, unspecified [M85.9]   • Other adult osteomalacia  [M83.8]   • Acute cystitis without hematuria [N30.00]   • Benign essential hypertension [I10]   • Cardiac arrhythmia [I49.9]   • Cervical radiculopathy [M54.12]   • H/O colonoscopy [Z98.890]   • Migraine [G43.909]   • Gastroesophageal reflux disease [K21.9]   • Pure hypercholesterolemia [E78.00]   • Diffuse goiter [E04.9]   • Hypertension [I10]   • Hypokalemia [E87.6]   • Hyponatremia [E87.1]   • Knee pain [M25.569]   • Low back pain [M54.5]   • Abnormal mammogram [R92.8]   • Non-toxic multinodular goiter [E04.2]   • Sacroiliac joint somatic dysfunction [M99.04]   • Thrombocytopenia (CMS/HCC) [D69.6]   • Tick bite [W57.XXXA]   • Ulcerative colitis (CMS/HCC) [K51.90]   • Varicose veins [I83.90]   • Vasovagal syncope [R55]   • Candidiasis of vagina [B37.3]     Review of Systems   Constitutional: Positive for activity change and fatigue. Negative for appetite change, chills, diaphoresis, fever and unexpected weight change.   HENT: Negative for congestion, dental problem, drooling, ear discharge, ear pain, facial swelling, hearing loss, mouth sores, nosebleeds, postnasal drip, rhinorrhea, sinus pressure, sneezing, sore throat, tinnitus, trouble swallowing and voice change.    Eyes: Negative for photophobia, pain, discharge, redness, itching and visual disturbance.   Respiratory: Negative for apnea, cough, choking, chest tightness, shortness of breath, wheezing and stridor.    Cardiovascular: Negative for chest pain, palpitations and leg swelling.   Gastrointestinal: Negative for abdominal distention, abdominal pain, anal bleeding, blood in stool, constipation, diarrhea, nausea, rectal pain and vomiting.   Endocrine: Negative for cold intolerance, heat intolerance, polydipsia, polyphagia and polyuria.   Genitourinary: Negative for decreased  "urine volume, difficulty urinating, dysuria, enuresis, flank pain, frequency, genital sores, hematuria and urgency.   Musculoskeletal: Positive for arthralgias, gait problem and joint swelling. Negative for back pain, myalgias, neck pain and neck stiffness.   Skin: Negative for color change, pallor, rash and wound.   Allergic/Immunologic: Negative for environmental allergies, food allergies and immunocompromised state.   Neurological: Negative for dizziness, tremors, seizures, syncope, facial asymmetry, speech difficulty, weakness, light-headedness, numbness and headaches.   Hematological: Negative for adenopathy. Does not bruise/bleed easily.   Psychiatric/Behavioral: Positive for decreased concentration. Negative for agitation, behavioral problems, confusion, dysphoric mood, hallucinations, self-injury, sleep disturbance and suicidal ideas. The patient is not nervous/anxious and is not hyperactive.      Social History     Socioeconomic History   • Marital status:      Spouse name: Not on file   • Number of children: Not on file   • Years of education: Not on file   • Highest education level: Not on file   Tobacco Use   • Smoking status: Never Smoker   • Smokeless tobacco: Current User   Substance and Sexual Activity   • Alcohol use: No   • Drug use: No   • Sexual activity: Defer     Family History   Problem Relation Age of Onset   • Heart disease Mother    • Heart disease Father    • Cancer Father         colon   • Alcohol abuse Father    • Hypertension Sister    • Cancer Brother    • Heart disease Brother    • Breast cancer Neg Hx    • Ovarian cancer Neg Hx      /72   Pulse 64   Ht 154.9 cm (61\")   Wt 63.4 kg (139 lb 12.8 oz)   LMP  (LMP Unknown)   SpO2 98%   BMI 26.41 kg/m²   Physical Exam  Vitals signs and nursing note reviewed.   Constitutional:       Appearance: Normal appearance. She is well-developed.   HENT:      Head: Normocephalic and atraumatic.   Eyes:      General: Lids are normal. "      Extraocular Movements: Extraocular movements intact.      Conjunctiva/sclera: Conjunctivae normal.      Pupils: Pupils are equal, round, and reactive to light.   Neck:      Musculoskeletal: Normal range of motion and neck supple.      Thyroid: No thyroid mass or thyromegaly.      Vascular: No carotid bruit.      Trachea: Trachea normal. No tracheal deviation.   Cardiovascular:      Rate and Rhythm: Normal rate and regular rhythm.      Heart sounds: Normal heart sounds. No murmur. No friction rub. No gallop.    Pulmonary:      Effort: Pulmonary effort is normal. No respiratory distress.      Breath sounds: Normal breath sounds. No wheezing.   Musculoskeletal: Normal range of motion.         General: No deformity.   Lymphadenopathy:      Cervical: No cervical adenopathy.   Skin:     General: Skin is warm and dry.      Findings: No erythema or rash.      Nails: There is no clubbing.     Neurological:      General: No focal deficit present.      Mental Status: She is alert and oriented to person, place, and time.      Cranial Nerves: No cranial nerve deficit.      Deep Tendon Reflexes: Reflexes are normal and symmetric. Reflexes normal.   Psychiatric:         Speech: Speech normal.         Behavior: Behavior normal.         Thought Content: Thought content normal.         Judgment: Judgment normal.       Results for orders placed or performed in visit on 06/05/20   Comprehensive Metabolic Panel    Specimen: Blood    BLOOD   Result Value Ref Range    Glucose 109 (H) 65 - 99 mg/dL    BUN 19 8 - 23 mg/dL    Creatinine 0.80 0.57 - 1.00 mg/dL    eGFR Non African Am 70 >60 mL/min/1.73    eGFR African Am 84 >60 mL/min/1.73    BUN/Creatinine Ratio 23.8 7.0 - 25.0    Sodium 137 136 - 145 mmol/L    Potassium 4.7 3.5 - 5.2 mmol/L    Chloride 104 98 - 107 mmol/L    Total CO2 24.8 22.0 - 29.0 mmol/L    Calcium 10.2 8.6 - 10.5 mg/dL    Total Protein 7.3 6.0 - 8.5 g/dL    Albumin 4.70 3.50 - 5.20 g/dL    Globulin 2.6 gm/dL    A/G  Ratio 1.8 g/dL    Total Bilirubin 0.9 0.2 - 1.2 mg/dL    Alkaline Phosphatase 47 39 - 117 U/L    AST (SGOT) 12 1 - 32 U/L    ALT (SGPT) 14 1 - 33 U/L   Lipid Panel    Specimen: Blood    BLOOD   Result Value Ref Range    Total Cholesterol 138 0 - 200 mg/dL    Triglycerides 66 0 - 150 mg/dL    HDL Cholesterol 46 40 - 60 mg/dL    VLDL Cholesterol 13.2 mg/dL    LDL Cholesterol  79 0 - 100 mg/dL   TSH    Specimen: Blood    BLOOD   Result Value Ref Range    TSH 2.440 0.270 - 4.200 uIU/mL   Hemoglobin A1c    Specimen: Blood    BLOOD   Result Value Ref Range    Hemoglobin A1C 5.90 (H) 4.80 - 5.60 %   Hepatitis C Antibody    Specimen: Blood    BLOOD   Result Value Ref Range    Hep C Virus Ab <0.1 0.0 - 0.9 s/co ratio     Problems Addressed this Visit        Cardiovascular and Mediastinum    Pure hypercholesterolemia     Is on low fat diet and omega 3 fa  Check flp          Relevant Orders    Lipid Panel    TSH    Benign essential hypertension - Primary     bp is well controlled   Continue monitoring and medication          Relevant Orders    Comprehensive Metabolic Panel    CBC Auto Differential       Digestive    Vitamin D deficiency     Is on vitamin D supplement   Check levels          Relevant Orders    Vitamin D 25 Hydroxy       Endocrine    Prediabetes     High a1c in pre dm range at last ov   Is careful with diet   Update a1c         Relevant Orders    Hemoglobin A1c    Non-toxic multinodular goiter     Has seen surgery in past  No symptoms currently   Clinical exam is stable  Continue with monitoring           Diagnoses       Codes Comments    Benign essential hypertension    -  Primary ICD-10-CM: I10  ICD-9-CM: 401.1     Pure hypercholesterolemia     ICD-10-CM: E78.00  ICD-9-CM: 272.0     Prediabetes     ICD-10-CM: R73.03  ICD-9-CM: 790.29     Non-toxic multinodular goiter     ICD-10-CM: E04.2  ICD-9-CM: 241.1     Vitamin D deficiency     ICD-10-CM: E55.9  ICD-9-CM: 268.9         Recommended update pneumovax and  shingles vaccine  Return in about 6 months (around 6/15/2021) for Recheck.    Liberty Berry MA  Signed Cynthia Farmer MD

## 2020-12-16 LAB
25(OH)D3+25(OH)D2 SERPL-MCNC: 33.3 NG/ML (ref 30–100)
ALBUMIN SERPL-MCNC: 4.7 G/DL (ref 3.7–4.7)
ALBUMIN/GLOB SERPL: 2 {RATIO} (ref 1.2–2.2)
ALP SERPL-CCNC: 58 IU/L (ref 39–117)
ALT SERPL-CCNC: 14 IU/L (ref 0–32)
AST SERPL-CCNC: 15 IU/L (ref 0–40)
BASOPHILS # BLD AUTO: 0.1 X10E3/UL (ref 0–0.2)
BASOPHILS NFR BLD AUTO: 2 %
BILIRUB SERPL-MCNC: 0.9 MG/DL (ref 0–1.2)
BUN SERPL-MCNC: 17 MG/DL (ref 8–27)
BUN/CREAT SERPL: 22 (ref 12–28)
CALCIUM SERPL-MCNC: 10.1 MG/DL (ref 8.7–10.3)
CHLORIDE SERPL-SCNC: 103 MMOL/L (ref 96–106)
CHOLEST SERPL-MCNC: 142 MG/DL (ref 100–199)
CO2 SERPL-SCNC: 25 MMOL/L (ref 20–29)
CREAT SERPL-MCNC: 0.78 MG/DL (ref 0.57–1)
EOSINOPHIL # BLD AUTO: 0.1 X10E3/UL (ref 0–0.4)
EOSINOPHIL NFR BLD AUTO: 2 %
ERYTHROCYTE [DISTWIDTH] IN BLOOD BY AUTOMATED COUNT: 13.1 % (ref 11.7–15.4)
GLOBULIN SER CALC-MCNC: 2.3 G/DL (ref 1.5–4.5)
GLUCOSE SERPL-MCNC: 104 MG/DL (ref 65–99)
HBA1C MFR BLD: 5.8 % (ref 4.8–5.6)
HCT VFR BLD AUTO: 32.2 % (ref 34–46.6)
HDLC SERPL-MCNC: 47 MG/DL
HGB BLD-MCNC: 11.2 G/DL (ref 11.1–15.9)
IMM GRANULOCYTES # BLD AUTO: 0 X10E3/UL (ref 0–0.1)
IMM GRANULOCYTES NFR BLD AUTO: 0 %
LDLC SERPL CALC-MCNC: 79 MG/DL (ref 0–99)
LYMPHOCYTES # BLD AUTO: 1.4 X10E3/UL (ref 0.7–3.1)
LYMPHOCYTES NFR BLD AUTO: 25 %
MCH RBC QN AUTO: 36.7 PG (ref 26.6–33)
MCHC RBC AUTO-ENTMCNC: 34.8 G/DL (ref 31.5–35.7)
MCV RBC AUTO: 106 FL (ref 79–97)
MONOCYTES # BLD AUTO: 0.6 X10E3/UL (ref 0.1–0.9)
MONOCYTES NFR BLD AUTO: 11 %
NEUTROPHILS # BLD AUTO: 3.2 X10E3/UL (ref 1.4–7)
NEUTROPHILS NFR BLD AUTO: 60 %
PLATELET # BLD AUTO: 177 X10E3/UL (ref 150–450)
POTASSIUM SERPL-SCNC: 4.8 MMOL/L (ref 3.5–5.2)
PROT SERPL-MCNC: 7 G/DL (ref 6–8.5)
RBC # BLD AUTO: 3.05 X10E6/UL (ref 3.77–5.28)
SODIUM SERPL-SCNC: 138 MMOL/L (ref 134–144)
TRIGL SERPL-MCNC: 81 MG/DL (ref 0–149)
TSH SERPL DL<=0.005 MIU/L-ACNC: 2.97 UIU/ML (ref 0.45–4.5)
VLDLC SERPL CALC-MCNC: 16 MG/DL (ref 5–40)
WBC # BLD AUTO: 5.4 X10E3/UL (ref 3.4–10.8)

## 2020-12-17 ENCOUNTER — HOSPITAL ENCOUNTER (OUTPATIENT)
Dept: MAMMOGRAPHY | Facility: HOSPITAL | Age: 80
Discharge: HOME | End: 2020-12-17
Admitting: OBSTETRICS & GYNECOLOGY

## 2020-12-17 DIAGNOSIS — Z12.31 VISIT FOR SCREENING MAMMOGRAM: ICD-10-CM

## 2020-12-17 PROCEDURE — 77067 SCR MAMMO BI INCL CAD: CPT

## 2020-12-17 PROCEDURE — 77063 BREAST TOMOSYNTHESIS BI: CPT | Performed by: RADIOLOGY

## 2020-12-17 PROCEDURE — 77067 SCR MAMMO BI INCL CAD: CPT | Performed by: RADIOLOGY

## 2020-12-17 PROCEDURE — 77063 BREAST TOMOSYNTHESIS BI: CPT
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
## 2021-07-22 RX ORDER — MEMANTINE HYDROCHLORIDE 5 MG/1
5 TABLET ORAL 2 TIMES DAILY
Qty: 180 TABLET | Refills: 1 | Status: SHIPPED | OUTPATIENT
Start: 2021-07-22 | End: 2022-03-03 | Stop reason: SDUPTHER

## 2021-08-12 ENCOUNTER — OFFICE VISIT (OUTPATIENT)
Dept: ENDOCRINOLOGY | Facility: CLINIC | Age: 81
End: 2021-08-12

## 2021-08-12 ENCOUNTER — LAB (OUTPATIENT)
Dept: LAB | Facility: HOSPITAL | Age: 81
End: 2021-08-12

## 2021-08-12 VITALS
BODY MASS INDEX: 23.33 KG/M2 | HEART RATE: 63 BPM | SYSTOLIC BLOOD PRESSURE: 132 MMHG | DIASTOLIC BLOOD PRESSURE: 62 MMHG | WEIGHT: 123.6 LBS | HEIGHT: 61 IN | OXYGEN SATURATION: 97 %

## 2021-08-12 DIAGNOSIS — R73.03 PREDIABETES: ICD-10-CM

## 2021-08-12 DIAGNOSIS — E78.00 PURE HYPERCHOLESTEROLEMIA: ICD-10-CM

## 2021-08-12 DIAGNOSIS — I10 BENIGN ESSENTIAL HYPERTENSION: Primary | ICD-10-CM

## 2021-08-12 DIAGNOSIS — E83.52 HYPERCALCEMIA: ICD-10-CM

## 2021-08-12 PROCEDURE — 99214 OFFICE O/P EST MOD 30 MIN: CPT | Performed by: INTERNAL MEDICINE

## 2021-08-12 RX ORDER — PANTOPRAZOLE SODIUM 40 MG/1
TABLET, DELAYED RELEASE ORAL
COMMUNITY
Start: 2021-07-15 | End: 2022-08-16 | Stop reason: SDUPTHER

## 2021-08-12 NOTE — PROGRESS NOTES
Zainab [place] 81 y.o.  CC:Follow-up, Hypertension, Hyperlipidemia, MNG, and Hypokalemia      [place]: Follow-up, Hypertension, Hyperlipidemia, MNG, and Hypokalemia    bp is good  Is on low fat diet   No dysphagia or hoarseness  Son brought her today     Allergies   Allergen Reactions   • Latex Other (See Comments)     Redness from adhesive tape       Current Outpatient Medications:   •  amLODIPine (NORVASC) 5 MG tablet, Take 1 tablet by mouth Every Night., Disp: 90 tablet, Rfl: 3  •  aspirin (ASPIRIN LOW DOSE) 81 MG tablet, Take 1 tablet by mouth daily., Disp: , Rfl:   •  clotrimazole-betamethasone (LOTRISONE) 1-0.05 % cream, , Disp: , Rfl: 1  •  fluticasone (FLONASE) 50 MCG/ACT nasal spray, , Disp: , Rfl:   •  Fluzone High-Dose Quadrivalent 0.7 ML suspension prefilled syringe, , Disp: , Rfl:   •  memantine (NAMENDA) 5 MG tablet, TAKE 1 TABLET BY MOUTH 2 (TWO) TIMES A DAY., Disp: 180 tablet, Rfl: 1  •  mesalamine (APRISO) 0.375 G 24 hr capsule, Take 1 capsule by mouth 2 (two) times a day., Disp: , Rfl:   •  Misc Natural Products (OSTEO BI-FLEX TRIPLE STRENGTH) tablet, Take  by mouth., Disp: , Rfl:   •  Multiple Vitamins-Minerals (CENTRUM SILVER) tablet, Take 1 tablet by mouth daily., Disp: , Rfl:   •  Multiple Vitamins-Minerals (HAIR FORMULA EXTRA STRENGTH PO), Take  by mouth., Disp: , Rfl:   •  Omega-3 Fatty Acids (FISH OIL) 1200 MG capsule capsule, Take 1 capsule by mouth daily., Disp: , Rfl:   •  pantoprazole (PROTONIX) 40 MG EC tablet, Take one po q day, Disp: , Rfl:   •  ramipril (ALTACE) 10 MG capsule, Take 1 capsule by mouth Every 12 (Twelve) Hours., Disp: 180 capsule, Rfl: 3  Patient Active Problem List    Diagnosis    • Prediabetes [R73.03]    • Hypercalcemia [E83.52]    • Elevated glucose [R73.09]    • Ingrown right big toenail [L60.0]    • Mild cognitive impairment [G31.84]    • Vitamin D deficiency [E55.9]    • Closed fracture of right ankle with delayed healing [U90.284G]    • Disorder of bone density and  structure, unspecified [M85.9]    • Other adult osteomalacia  [M83.8]    • Acute cystitis without hematuria [N30.00]    • Benign essential hypertension [I10]    • Cardiac arrhythmia [I49.9]    • Cervical radiculopathy [M54.12]    • H/O colonoscopy [Z98.890]    • Migraine [G43.909]    • Gastroesophageal reflux disease [K21.9]    • Pure hypercholesterolemia [E78.00]    • Diffuse goiter [E04.9]    • Hypertension [I10]    • Hypokalemia [E87.6]    • Hyponatremia [E87.1]    • Knee pain [M25.569]    • Low back pain [M54.5]    • Abnormal mammogram [R92.8]    • Non-toxic multinodular goiter [E04.2]    • Sacroiliac joint somatic dysfunction [M99.04]    • Thrombocytopenia (CMS/HCC) [D69.6]    • Tick bite [W57.XXXA]    • Ulcerative colitis (CMS/HCC) [K51.90]    • Varicose veins [I83.90]    • Vasovagal syncope [R55]    • Candidiasis of vagina [B37.3]      Review of Systems   Constitutional: Negative for activity change, appetite change and unexpected weight change.   HENT: Negative for congestion and rhinorrhea.    Eyes: Negative for visual disturbance.   Respiratory: Negative for cough and shortness of breath.    Cardiovascular: Positive for leg swelling. Negative for palpitations.   Gastrointestinal: Negative for constipation, diarrhea and nausea.   Genitourinary: Negative for hematuria.   Musculoskeletal: Positive for joint swelling. Negative for arthralgias, back pain, gait problem and myalgias.   Skin: Negative for color change, rash and wound.   Allergic/Immunologic: Negative for environmental allergies, food allergies and immunocompromised state.   Neurological: Negative for dizziness, weakness and light-headedness.   Psychiatric/Behavioral: Negative for confusion, decreased concentration, dysphoric mood and sleep disturbance. The patient is not nervous/anxious.      Social History     Socioeconomic History   • Marital status:      Spouse name: Not on file   • Number of children: Not on file   • Years of education:  "Not on file   • Highest education level: Not on file   Tobacco Use   • Smoking status: Never Smoker   • Smokeless tobacco: Current User   Substance and Sexual Activity   • Alcohol use: No   • Drug use: No   • Sexual activity: Defer     Family History   Problem Relation Age of Onset   • Heart disease Mother    • Heart disease Father    • Cancer Father         colon   • Alcohol abuse Father    • Hypertension Sister    • Cancer Brother    • Heart disease Brother    • Breast cancer Neg Hx    • Ovarian cancer Neg Hx      /62   Pulse 63   Ht 154.9 cm (61\")   Wt 56.1 kg (123 lb 9.6 oz)   LMP  (LMP Unknown)   SpO2 97%   BMI 23.35 kg/m²   Physical Exam  Vitals and nursing note reviewed.   Constitutional:       Appearance: Normal appearance. She is well-developed.   HENT:      Head: Normocephalic and atraumatic.   Eyes:      General: Lids are normal.      Extraocular Movements: Extraocular movements intact.      Conjunctiva/sclera: Conjunctivae normal.      Pupils: Pupils are equal, round, and reactive to light.   Neck:      Thyroid: No thyroid mass or thyromegaly.      Vascular: No carotid bruit.      Trachea: Trachea normal. No tracheal deviation.   Cardiovascular:      Rate and Rhythm: Normal rate and regular rhythm.      Heart sounds: Normal heart sounds. No murmur heard.   No friction rub. No gallop.    Pulmonary:      Effort: Pulmonary effort is normal. No respiratory distress.      Breath sounds: Normal breath sounds. No wheezing.   Musculoskeletal:         General: Swelling and deformity present. Normal range of motion.      Cervical back: Normal range of motion and neck supple.      Right lower leg: Edema present.      Left lower leg: Edema present.   Lymphadenopathy:      Cervical: No cervical adenopathy.   Skin:     General: Skin is warm and dry.      Findings: No erythema or rash.      Nails: There is no clubbing.   Neurological:      Mental Status: She is alert and oriented to person, place, and time. "      Cranial Nerves: No cranial nerve deficit.      Deep Tendon Reflexes: Reflexes are normal and symmetric. Reflexes normal.   Psychiatric:         Speech: Speech normal.         Behavior: Behavior normal.         Thought Content: Thought content normal.         Judgment: Judgment normal.       Results for orders placed or performed in visit on 12/15/20   Comprehensive Metabolic Panel    Specimen: Blood    BLOOD   Result Value Ref Range    Glucose 104 (H) 65 - 99 mg/dL    BUN 17 8 - 27 mg/dL    Creatinine 0.78 0.57 - 1.00 mg/dL    eGFR Non African Am 73 >59 mL/min/1.73    eGFR African Am 84 >59 mL/min/1.73    BUN/Creatinine Ratio 22 12 - 28    Sodium 138 134 - 144 mmol/L    Potassium 4.8 3.5 - 5.2 mmol/L    Chloride 103 96 - 106 mmol/L    Total CO2 25 20 - 29 mmol/L    Calcium 10.1 8.7 - 10.3 mg/dL    Total Protein 7.0 6.0 - 8.5 g/dL    Albumin 4.7 3.7 - 4.7 g/dL    Globulin 2.3 1.5 - 4.5 g/dL    A/G Ratio 2.0 1.2 - 2.2    Total Bilirubin 0.9 0.0 - 1.2 mg/dL    Alkaline Phosphatase 58 39 - 117 IU/L    AST (SGOT) 15 0 - 40 IU/L    ALT (SGPT) 14 0 - 32 IU/L   Lipid Panel    Specimen: Blood    BLOOD   Result Value Ref Range    Total Cholesterol 142 100 - 199 mg/dL    Triglycerides 81 0 - 149 mg/dL    HDL Cholesterol 47 >39 mg/dL    VLDL Cholesterol Robert 16 5 - 40 mg/dL    LDL Chol Calc (NIH) 79 0 - 99 mg/dL   TSH    Specimen: Blood    BLOOD   Result Value Ref Range    TSH 2.970 0.450 - 4.500 uIU/mL   Vitamin D 25 Hydroxy    Specimen: Blood    BLOOD   Result Value Ref Range    25 Hydroxy, Vitamin D 33.3 30.0 - 100.0 ng/mL   Hemoglobin A1c    Specimen: Blood    BLOOD   Result Value Ref Range    Hemoglobin A1C 5.8 (H) 4.8 - 5.6 %   CBC & Differential    BLOOD   Result Value Ref Range    WBC 5.4 3.4 - 10.8 x10E3/uL    RBC 3.05 (L) 3.77 - 5.28 x10E6/uL    Hemoglobin 11.2 11.1 - 15.9 g/dL    Hematocrit 32.2 (L) 34.0 - 46.6 %     (H) 79 - 97 fL    MCH 36.7 (H) 26.6 - 33.0 pg    MCHC 34.8 31.5 - 35.7 g/dL    RDW 13.1  11.7 - 15.4 %    Platelets 177 150 - 450 x10E3/uL    Neutrophil Rel % 60 Not Estab. %    Lymphocyte Rel % 25 Not Estab. %    Monocyte Rel % 11 Not Estab. %    Eosinophil Rel % 2 Not Estab. %    Basophil Rel % 2 Not Estab. %    Neutrophils Absolute 3.2 1.4 - 7.0 x10E3/uL    Lymphocytes Absolute 1.4 0.7 - 3.1 x10E3/uL    Monocytes Absolute 0.6 0.1 - 0.9 x10E3/uL    Eosinophils Absolute 0.1 0.0 - 0.4 x10E3/uL    Basophils Absolute 0.1 0.0 - 0.2 x10E3/uL    Immature Granulocyte Rel % 0 Not Estab. %    Immature Grans Absolute 0.0 0.0 - 0.1 x10E3/uL     Diagnoses and all orders for this visit:    1. Benign essential hypertension (Primary)  Assessment & Plan:  bp is normal- continue monitoring and altace     Orders:  -     Comprehensive Metabolic Panel    2. Hypercalcemia  Assessment & Plan:  Update total calcium, vitamin D     Orders:  -     Vitamin D 25 Hydroxy    3. Pure hypercholesterolemia  Assessment & Plan:  Is on low fat diet and fish oil  Check flp     Orders:  -     Lipid Panel  -     TSH  -     T4, Free    4. Prediabetes  Assessment & Plan:  Update a1c- she has lost weight and intends to keep it off    Orders:  -     Hemoglobin A1c  Return in about 6 months (around 2/12/2022) for Recheck.    Cynthia Farmer MD  Signed Cynthia Farmer MD

## 2021-08-13 LAB
25(OH)D3+25(OH)D2 SERPL-MCNC: 35.5 NG/ML (ref 30–100)
ALBUMIN SERPL-MCNC: 4.8 G/DL (ref 3.5–5.2)
ALBUMIN/GLOB SERPL: 2.7 G/DL
ALP SERPL-CCNC: 51 U/L (ref 39–117)
ALT SERPL-CCNC: 14 U/L (ref 1–33)
AST SERPL-CCNC: 11 U/L (ref 1–32)
BILIRUB SERPL-MCNC: 1 MG/DL (ref 0–1.2)
BUN SERPL-MCNC: 14 MG/DL (ref 8–23)
BUN/CREAT SERPL: 19.4 (ref 7–25)
CALCIUM SERPL-MCNC: 9.9 MG/DL (ref 8.6–10.5)
CHLORIDE SERPL-SCNC: 104 MMOL/L (ref 98–107)
CHOLEST SERPL-MCNC: 121 MG/DL (ref 0–200)
CO2 SERPL-SCNC: 25.3 MMOL/L (ref 22–29)
CREAT SERPL-MCNC: 0.72 MG/DL (ref 0.57–1)
GLOBULIN SER CALC-MCNC: 1.8 GM/DL
GLUCOSE SERPL-MCNC: 95 MG/DL (ref 65–99)
HBA1C MFR BLD: 5.9 % (ref 4.8–5.6)
HDLC SERPL-MCNC: 52 MG/DL (ref 40–60)
LDLC SERPL CALC-MCNC: 56 MG/DL (ref 0–100)
POTASSIUM SERPL-SCNC: 4.7 MMOL/L (ref 3.5–5.2)
PROT SERPL-MCNC: 6.6 G/DL (ref 6–8.5)
SODIUM SERPL-SCNC: 140 MMOL/L (ref 136–145)
T4 FREE SERPL-MCNC: 1.27 NG/DL (ref 0.93–1.7)
TRIGL SERPL-MCNC: 58 MG/DL (ref 0–150)
TSH SERPL DL<=0.005 MIU/L-ACNC: 2.27 UIU/ML (ref 0.27–4.2)
VLDLC SERPL CALC-MCNC: 13 MG/DL (ref 5–40)

## 2021-08-26 DIAGNOSIS — I10 ESSENTIAL HYPERTENSION: ICD-10-CM

## 2021-08-26 RX ORDER — RAMIPRIL 10 MG/1
CAPSULE ORAL
Qty: 180 CAPSULE | Refills: 1 | Status: SHIPPED | OUTPATIENT
Start: 2021-08-26 | End: 2022-03-03

## 2021-08-30 DIAGNOSIS — I10 ESSENTIAL HYPERTENSION: ICD-10-CM

## 2021-08-30 RX ORDER — AMLODIPINE BESYLATE 5 MG/1
5 TABLET ORAL NIGHTLY
Qty: 90 TABLET | Refills: 1 | Status: SHIPPED | OUTPATIENT
Start: 2021-08-30 | End: 2022-02-25 | Stop reason: SDUPTHER

## 2021-11-09 ENCOUNTER — TRANSCRIBE ORDERS (OUTPATIENT)
Dept: ADMINISTRATIVE | Facility: HOSPITAL | Age: 81
End: 2021-11-09

## 2021-11-09 DIAGNOSIS — Z12.31 VISIT FOR SCREENING MAMMOGRAM: Primary | ICD-10-CM

## 2021-12-23 ENCOUNTER — HOSPITAL ENCOUNTER (OUTPATIENT)
Dept: MAMMOGRAPHY | Facility: HOSPITAL | Age: 81
Discharge: HOME | End: 2021-12-23
Admitting: OBSTETRICS & GYNECOLOGY

## 2021-12-23 DIAGNOSIS — Z12.31 VISIT FOR SCREENING MAMMOGRAM: ICD-10-CM

## 2021-12-23 PROCEDURE — 77063 BREAST TOMOSYNTHESIS BI: CPT

## 2021-12-23 PROCEDURE — 77067 SCR MAMMO BI INCL CAD: CPT | Performed by: RADIOLOGY

## 2021-12-23 PROCEDURE — 77067 SCR MAMMO BI INCL CAD: CPT

## 2021-12-23 PROCEDURE — 77063 BREAST TOMOSYNTHESIS BI: CPT | Performed by: RADIOLOGY

## 2022-02-15 ENCOUNTER — OFFICE VISIT (OUTPATIENT)
Dept: ENDOCRINOLOGY | Facility: CLINIC | Age: 82
End: 2022-02-15

## 2022-02-15 ENCOUNTER — LAB (OUTPATIENT)
Dept: LAB | Facility: HOSPITAL | Age: 82
End: 2022-02-15

## 2022-02-15 VITALS
OXYGEN SATURATION: 97 % | HEART RATE: 66 BPM | HEIGHT: 61 IN | WEIGHT: 121.6 LBS | DIASTOLIC BLOOD PRESSURE: 64 MMHG | SYSTOLIC BLOOD PRESSURE: 126 MMHG | BODY MASS INDEX: 22.96 KG/M2

## 2022-02-15 DIAGNOSIS — R73.03 PREDIABETES: ICD-10-CM

## 2022-02-15 DIAGNOSIS — I10 BENIGN ESSENTIAL HYPERTENSION: Primary | ICD-10-CM

## 2022-02-15 DIAGNOSIS — E55.9 VITAMIN D DEFICIENCY: ICD-10-CM

## 2022-02-15 DIAGNOSIS — R73.09 ELEVATED GLUCOSE: ICD-10-CM

## 2022-02-15 PROCEDURE — 99214 OFFICE O/P EST MOD 30 MIN: CPT | Performed by: INTERNAL MEDICINE

## 2022-02-15 RX ORDER — MULTIVIT WITH MINERALS/LUTEIN
1000 TABLET ORAL DAILY
COMMUNITY

## 2022-02-15 NOTE — PROGRESS NOTES
Zainab PONCE [place] 81 y.o.  CC:Follow-up, Hyperlipidemia, Hypertension, MNG, and Hypokalemia      [place]: Follow-up, Hyperlipidemia, Hypertension, MNG, and Hypokalemia    bp is good taking altace and norvasc  Is on low fat diet and fish oil  No dysphagia   No muscle cramps  Prior blood sugar elevation- watching diet    Allergies   Allergen Reactions   • Latex Other (See Comments)     Redness from adhesive tape       Current Outpatient Medications:   •  ascorbic acid (VITAMIN C) 1000 MG tablet, Take 1,000 mg by mouth Daily., Disp: , Rfl:   •  amLODIPine (NORVASC) 5 MG tablet, TAKE 1 TABLET BY MOUTH EVERY NIGHT., Disp: 90 tablet, Rfl: 1  •  aspirin (ASPIRIN LOW DOSE) 81 MG tablet, Take 1 tablet by mouth daily., Disp: , Rfl:   •  clotrimazole-betamethasone (LOTRISONE) 1-0.05 % cream, , Disp: , Rfl: 1  •  fluticasone (FLONASE) 50 MCG/ACT nasal spray, , Disp: , Rfl:   •  Fluzone High-Dose Quadrivalent 0.7 ML suspension prefilled syringe, , Disp: , Rfl:   •  memantine (NAMENDA) 5 MG tablet, TAKE 1 TABLET BY MOUTH 2 (TWO) TIMES A DAY., Disp: 180 tablet, Rfl: 1  •  mesalamine (APRISO) 0.375 G 24 hr capsule, Take 1 capsule by mouth 2 (two) times a day., Disp: , Rfl:   •  Misc Natural Products (OSTEO BI-FLEX TRIPLE STRENGTH) tablet, Take  by mouth., Disp: , Rfl:   •  Multiple Vitamins-Minerals (CENTRUM SILVER) tablet, Take 1 tablet by mouth daily., Disp: , Rfl:   •  Omega-3 Fatty Acids (FISH OIL) 1200 MG capsule capsule, Take 1 capsule by mouth daily., Disp: , Rfl:   •  pantoprazole (PROTONIX) 40 MG EC tablet, Take one po q day, Disp: , Rfl:   •  ramipril (ALTACE) 10 MG capsule, TAKE 1 CAPSULE BY MOUTH EVERY 12 HOURS., Disp: 180 capsule, Rfl: 1  Patient Active Problem List    Diagnosis    • Prediabetes [R73.03]    • Hypercalcemia [E83.52]    • Elevated glucose [R73.09]    • Ingrown right big toenail [L60.0]    • Mild cognitive impairment [G31.84]    • Vitamin D deficiency [E55.9]    • Closed fracture of right ankle with delayed  healing [S82.891G]    • Disorder of bone density and structure, unspecified [M85.9]    • Other adult osteomalacia  [M83.8]    • Acute cystitis without hematuria [N30.00]    • Benign essential hypertension [I10]    • Cardiac arrhythmia [I49.9]    • Cervical radiculopathy [M54.12]    • H/O colonoscopy [Z98.890]    • Migraine [G43.909]    • Gastroesophageal reflux disease [K21.9]    • Pure hypercholesterolemia [E78.00]    • Diffuse goiter [E04.9]    • Hypertension [I10]    • Hypokalemia [E87.6]    • Hyponatremia [E87.1]    • Knee pain [M25.569]    • Low back pain [M54.50]    • Abnormal mammogram [R92.8]    • Non-toxic multinodular goiter [E04.2]    • Sacroiliac joint somatic dysfunction [M99.04]    • Thrombocytopenia (HCC) [D69.6]    • Tick bite [W57.XXXA]    • Ulcerative colitis (HCC) [K51.90]    • Varicose veins [I83.90]    • Vasovagal syncope [R55]    • Candidiasis of vagina [B37.3]      Review of Systems   Constitutional: Positive for unexpected weight change. Negative for activity change and appetite change.   HENT: Negative for congestion and rhinorrhea.    Eyes: Negative for visual disturbance.   Respiratory: Negative for cough and shortness of breath.    Cardiovascular: Negative for palpitations and leg swelling.   Gastrointestinal: Negative for constipation, diarrhea and nausea.   Genitourinary: Negative for hematuria.   Musculoskeletal: Positive for arthralgias and gait problem. Negative for back pain, joint swelling and myalgias.        Left hip stiff after sitting   Skin: Negative for color change, rash and wound.   Allergic/Immunologic: Negative for environmental allergies, food allergies and immunocompromised state.   Neurological: Negative for dizziness, weakness and light-headedness.   Psychiatric/Behavioral: Negative for confusion, decreased concentration, dysphoric mood and sleep disturbance. The patient is not nervous/anxious.      Social History     Socioeconomic History   • Marital status:   "  Tobacco Use   • Smoking status: Never Smoker   • Smokeless tobacco: Current User   Substance and Sexual Activity   • Alcohol use: No   • Drug use: No   • Sexual activity: Defer     Family History   Problem Relation Age of Onset   • Heart disease Mother    • Heart disease Father    • Cancer Father         colon   • Alcohol abuse Father    • Hypertension Sister    • Cancer Brother    • Heart disease Brother    • Breast cancer Neg Hx    • Ovarian cancer Neg Hx      /64   Pulse 66   Ht 154.9 cm (61\")   Wt 55.2 kg (121 lb 9.6 oz)   LMP  (LMP Unknown)   SpO2 97%   BMI 22.98 kg/m²   Physical Exam  Vitals and nursing note reviewed.   Constitutional:       Appearance: Normal appearance. She is well-developed.   HENT:      Head: Normocephalic and atraumatic.   Eyes:      General: Lids are normal.      Extraocular Movements: Extraocular movements intact.      Conjunctiva/sclera: Conjunctivae normal.      Pupils: Pupils are equal, round, and reactive to light.   Neck:      Thyroid: No thyroid mass or thyromegaly.      Vascular: No carotid bruit.      Trachea: Trachea normal. No tracheal deviation.   Cardiovascular:      Rate and Rhythm: Normal rate and regular rhythm.      Heart sounds: Murmur heard.   No friction rub. No gallop.    Pulmonary:      Effort: Pulmonary effort is normal. No respiratory distress.      Breath sounds: Normal breath sounds. No wheezing.   Musculoskeletal:         General: Swelling and deformity present. Normal range of motion.      Cervical back: Normal range of motion and neck supple.   Lymphadenopathy:      Cervical: No cervical adenopathy.   Skin:     General: Skin is warm and dry.      Findings: No erythema or rash.      Nails: There is no clubbing.   Neurological:      Mental Status: She is alert and oriented to person, place, and time.      Cranial Nerves: No cranial nerve deficit.      Deep Tendon Reflexes: Reflexes are normal and symmetric. Reflexes normal.   Psychiatric:         " Mood and Affect: Mood normal.         Speech: Speech normal.         Behavior: Behavior normal.         Thought Content: Thought content normal.         Judgment: Judgment normal.       Results for orders placed or performed in visit on 08/12/21   Comprehensive Metabolic Panel    Specimen: Blood    BLOOD  RELEASE TO DAVID   Result Value Ref Range    Glucose 95 65 - 99 mg/dL    BUN 14 8 - 23 mg/dL    Creatinine 0.72 0.57 - 1.00 mg/dL    eGFR Non African Am 78 >60 mL/min/1.73    eGFR African Am 95 >60 mL/min/1.73    BUN/Creatinine Ratio 19.4 7.0 - 25.0    Sodium 140 136 - 145 mmol/L    Potassium 4.7 3.5 - 5.2 mmol/L    Chloride 104 98 - 107 mmol/L    Total CO2 25.3 22.0 - 29.0 mmol/L    Calcium 9.9 8.6 - 10.5 mg/dL    Total Protein 6.6 6.0 - 8.5 g/dL    Albumin 4.80 3.50 - 5.20 g/dL    Globulin 1.8 gm/dL    A/G Ratio 2.7 g/dL    Total Bilirubin 1.0 0.0 - 1.2 mg/dL    Alkaline Phosphatase 51 39 - 117 U/L    AST (SGOT) 11 1 - 32 U/L    ALT (SGPT) 14 1 - 33 U/L   Lipid Panel    Specimen: Blood    BLOOD  RELEASE TO DAVID   Result Value Ref Range    Total Cholesterol 121 0 - 200 mg/dL    Triglycerides 58 0 - 150 mg/dL    HDL Cholesterol 52 40 - 60 mg/dL    VLDL Cholesterol Robert 13 5 - 40 mg/dL    LDL Chol Calc (NIH) 56 0 - 100 mg/dL   TSH    Specimen: Blood    BLOOD  RELEASE TO DAVID   Result Value Ref Range    TSH 2.270 0.270 - 4.200 uIU/mL   T4, Free    Specimen: Blood    BLOOD  RELEASE TO DAVID   Result Value Ref Range    Free T4 1.27 0.93 - 1.70 ng/dL   Hemoglobin A1c    Specimen: Blood    BLOOD  RELEASE TO DAVID   Result Value Ref Range    Hemoglobin A1C 5.90 (H) 4.80 - 5.60 %   Vitamin D 25 Hydroxy    Specimen: Blood    BLOOD  RELEASE TO DAVID   Result Value Ref Range    25 Hydroxy, Vitamin D 35.5 30.0 - 100.0 ng/ml     Diagnoses and all orders for this visit:    1. Benign essential hypertension (Primary)  Assessment & Plan:  bp is well controlled  Continue monitoring     Orders:  -     Comprehensive Metabolic Panel  -      Lipid Panel  -     TSH  -     T4, Free    2. Elevated glucose  Assessment & Plan:  Check a1c       3. Prediabetes  Assessment & Plan:  See above  Continue to avoid sweets, sugared drinks     Orders:  -     Hemoglobin A1c    4. Vitamin D deficiency  Assessment & Plan:  Continue supplement  Check levels     Orders:  -     Vitamin D 25 Hydroxy  Return in about 6 months (around 8/15/2022) for Recheck.    Cynthia Farmer MD  Signed Cynthia Farmer MD

## 2022-02-16 LAB
25(OH)D3+25(OH)D2 SERPL-MCNC: 30.1 NG/ML (ref 30–100)
ALBUMIN SERPL-MCNC: 4.5 G/DL (ref 3.7–4.7)
ALBUMIN/GLOB SERPL: 1.9 {RATIO} (ref 1.2–2.2)
ALP SERPL-CCNC: 47 IU/L (ref 44–121)
ALT SERPL-CCNC: 10 IU/L (ref 0–32)
AST SERPL-CCNC: 17 IU/L (ref 0–40)
BILIRUB SERPL-MCNC: 1.2 MG/DL (ref 0–1.2)
BUN SERPL-MCNC: 17 MG/DL (ref 8–27)
BUN/CREAT SERPL: 25 (ref 12–28)
CALCIUM SERPL-MCNC: 9.8 MG/DL (ref 8.7–10.3)
CHLORIDE SERPL-SCNC: 102 MMOL/L (ref 96–106)
CHOLEST SERPL-MCNC: 119 MG/DL (ref 100–199)
CO2 SERPL-SCNC: 23 MMOL/L (ref 20–29)
CREAT SERPL-MCNC: 0.68 MG/DL (ref 0.57–1)
GLOBULIN SER CALC-MCNC: 2.4 G/DL (ref 1.5–4.5)
GLUCOSE SERPL-MCNC: 92 MG/DL (ref 65–99)
HBA1C MFR BLD: 5.8 % (ref 4.8–5.6)
HDLC SERPL-MCNC: 46 MG/DL
LDLC SERPL CALC-MCNC: 60 MG/DL (ref 0–99)
POTASSIUM SERPL-SCNC: 4.4 MMOL/L (ref 3.5–5.2)
PROT SERPL-MCNC: 6.9 G/DL (ref 6–8.5)
SODIUM SERPL-SCNC: 137 MMOL/L (ref 134–144)
T4 FREE SERPL-MCNC: 1.32 NG/DL (ref 0.82–1.77)
TRIGL SERPL-MCNC: 56 MG/DL (ref 0–149)
TSH SERPL DL<=0.005 MIU/L-ACNC: 2.63 UIU/ML (ref 0.45–4.5)
VLDLC SERPL CALC-MCNC: 13 MG/DL (ref 5–40)

## 2022-02-25 DIAGNOSIS — I10 ESSENTIAL HYPERTENSION: ICD-10-CM

## 2022-02-25 RX ORDER — AMLODIPINE BESYLATE 5 MG/1
5 TABLET ORAL NIGHTLY
Qty: 90 TABLET | Refills: 1 | Status: SHIPPED | OUTPATIENT
Start: 2022-02-25 | End: 2022-08-16 | Stop reason: SDUPTHER

## 2022-03-02 DIAGNOSIS — I10 ESSENTIAL HYPERTENSION: ICD-10-CM

## 2022-03-02 NOTE — TELEPHONE ENCOUNTER
Patient called and needs ramipril (ALTACE) refilled.  Also the memantine (NAMENDA) refilled.  Please call in to her pharmacy and advise when completed.  Thank you

## 2022-03-03 RX ORDER — MEMANTINE HYDROCHLORIDE 5 MG/1
5 TABLET ORAL 2 TIMES DAILY
Qty: 180 TABLET | Refills: 1 | Status: SHIPPED | OUTPATIENT
Start: 2022-03-03 | End: 2022-06-27

## 2022-03-03 RX ORDER — RAMIPRIL 10 MG/1
CAPSULE ORAL
Qty: 180 CAPSULE | Refills: 1 | Status: SHIPPED | OUTPATIENT
Start: 2022-03-03 | End: 2022-08-16 | Stop reason: SDUPTHER

## 2022-06-27 RX ORDER — MEMANTINE HYDROCHLORIDE 5 MG/1
5 TABLET ORAL 2 TIMES DAILY
Qty: 180 TABLET | Refills: 1 | Status: SHIPPED | OUTPATIENT
Start: 2022-06-27 | End: 2022-08-16 | Stop reason: SDUPTHER

## 2022-08-16 ENCOUNTER — OFFICE VISIT (OUTPATIENT)
Dept: ENDOCRINOLOGY | Facility: CLINIC | Age: 82
End: 2022-08-16

## 2022-08-16 ENCOUNTER — LAB (OUTPATIENT)
Dept: LAB | Facility: HOSPITAL | Age: 82
End: 2022-08-16

## 2022-08-16 VITALS
HEIGHT: 61 IN | OXYGEN SATURATION: 100 % | HEART RATE: 75 BPM | WEIGHT: 120 LBS | DIASTOLIC BLOOD PRESSURE: 52 MMHG | SYSTOLIC BLOOD PRESSURE: 122 MMHG | BODY MASS INDEX: 22.66 KG/M2

## 2022-08-16 DIAGNOSIS — I10 PRIMARY HYPERTENSION: ICD-10-CM

## 2022-08-16 DIAGNOSIS — I10 ESSENTIAL HYPERTENSION: ICD-10-CM

## 2022-08-16 DIAGNOSIS — R73.09 ELEVATED GLUCOSE: Primary | ICD-10-CM

## 2022-08-16 DIAGNOSIS — E04.0 DIFFUSE GOITER: ICD-10-CM

## 2022-08-16 DIAGNOSIS — E78.00 PURE HYPERCHOLESTEROLEMIA: ICD-10-CM

## 2022-08-16 DIAGNOSIS — E55.9 VITAMIN D DEFICIENCY: ICD-10-CM

## 2022-08-16 PROCEDURE — 99214 OFFICE O/P EST MOD 30 MIN: CPT | Performed by: INTERNAL MEDICINE

## 2022-08-16 RX ORDER — RAMIPRIL 10 MG/1
10 CAPSULE ORAL EVERY 12 HOURS
Qty: 180 CAPSULE | Refills: 1 | Status: SHIPPED | OUTPATIENT
Start: 2022-08-16 | End: 2023-02-23

## 2022-08-16 RX ORDER — PANTOPRAZOLE SODIUM 40 MG/1
40 TABLET, DELAYED RELEASE ORAL DAILY
Qty: 90 TABLET | Refills: 1 | Status: SHIPPED | OUTPATIENT
Start: 2022-08-16 | End: 2023-02-23

## 2022-08-16 RX ORDER — MESALAMINE 0.38 G/1
0.38 CAPSULE, EXTENDED RELEASE ORAL DAILY
Qty: 90 CAPSULE | Refills: 1 | Status: SHIPPED | OUTPATIENT
Start: 2022-08-16 | End: 2022-08-24

## 2022-08-16 RX ORDER — MEMANTINE HYDROCHLORIDE 5 MG/1
5 TABLET ORAL 2 TIMES DAILY
Qty: 180 TABLET | Refills: 1 | Status: SHIPPED | OUTPATIENT
Start: 2022-08-16

## 2022-08-16 RX ORDER — AMLODIPINE BESYLATE 5 MG/1
5 TABLET ORAL NIGHTLY
Qty: 90 TABLET | Refills: 1 | Status: SHIPPED | OUTPATIENT
Start: 2022-08-16 | End: 2023-02-23
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
## 2022-08-17 ENCOUNTER — TELEPHONE (OUTPATIENT)
Dept: ENDOCRINOLOGY | Facility: CLINIC | Age: 82
End: 2022-08-17

## 2022-08-17 DIAGNOSIS — D64.9 ANEMIA, UNSPECIFIED TYPE: Primary | ICD-10-CM

## 2022-08-17 LAB
25(OH)D3+25(OH)D2 SERPL-MCNC: 35.3 NG/ML (ref 30–100)
ALBUMIN SERPL-MCNC: 4.7 G/DL (ref 3.5–5.2)
ALBUMIN/GLOB SERPL: 2.5 G/DL
ALP SERPL-CCNC: 49 U/L (ref 39–117)
ALT SERPL-CCNC: 10 U/L (ref 1–33)
AST SERPL-CCNC: 14 U/L (ref 1–32)
BASOPHILS # BLD AUTO: 0.09 10*3/MM3 (ref 0–0.2)
BASOPHILS NFR BLD AUTO: 1.8 % (ref 0–1.5)
BILIRUB SERPL-MCNC: 1.4 MG/DL (ref 0–1.2)
BUN SERPL-MCNC: 13 MG/DL (ref 8–23)
BUN/CREAT SERPL: 19.4 (ref 7–25)
CALCIUM SERPL-MCNC: 9.5 MG/DL (ref 8.6–10.5)
CHLORIDE SERPL-SCNC: 99 MMOL/L (ref 98–107)
CHOLEST SERPL-MCNC: 117 MG/DL (ref 0–200)
CO2 SERPL-SCNC: 24.9 MMOL/L (ref 22–29)
CREAT SERPL-MCNC: 0.67 MG/DL (ref 0.57–1)
EGFRCR-CYS SERPLBLD CKD-EPI 2021: 88.5 ML/MIN/1.73
EOSINOPHIL # BLD AUTO: 0.05 10*3/MM3 (ref 0–0.4)
EOSINOPHIL NFR BLD AUTO: 1 % (ref 0.3–6.2)
ERYTHROCYTE [DISTWIDTH] IN BLOOD BY AUTOMATED COUNT: 18.1 % (ref 12.3–15.4)
GLOBULIN SER CALC-MCNC: 1.9 GM/DL
GLUCOSE SERPL-MCNC: 88 MG/DL (ref 65–99)
HBA1C MFR BLD: 5.6 % (ref 4.8–5.6)
HCT VFR BLD AUTO: 23.8 % (ref 34–46.6)
HDLC SERPL-MCNC: 50 MG/DL (ref 40–60)
HGB BLD-MCNC: 8 G/DL (ref 12–15.9)
IMM GRANULOCYTES # BLD AUTO: 0.02 10*3/MM3 (ref 0–0.05)
IMM GRANULOCYTES NFR BLD AUTO: 0.4 % (ref 0–0.5)
LDLC SERPL CALC-MCNC: 54 MG/DL (ref 0–100)
LYMPHOCYTES # BLD AUTO: 1.16 10*3/MM3 (ref 0.7–3.1)
LYMPHOCYTES NFR BLD AUTO: 23.2 % (ref 19.6–45.3)
MCH RBC QN AUTO: 35.2 PG (ref 26.6–33)
MCHC RBC AUTO-ENTMCNC: 33.6 G/DL (ref 31.5–35.7)
MCV RBC AUTO: 104.8 FL (ref 79–97)
MONOCYTES # BLD AUTO: 0.54 10*3/MM3 (ref 0.1–0.9)
MONOCYTES NFR BLD AUTO: 10.8 % (ref 5–12)
NEUTROPHILS # BLD AUTO: 3.15 10*3/MM3 (ref 1.7–7)
NEUTROPHILS NFR BLD AUTO: 62.8 % (ref 42.7–76)
NRBC BLD AUTO-RTO: 1.4 /100 WBC (ref 0–0.2)
PLATELET # BLD AUTO: 239 10*3/MM3 (ref 140–450)
POTASSIUM SERPL-SCNC: 4.5 MMOL/L (ref 3.5–5.2)
PROT SERPL-MCNC: 6.6 G/DL (ref 6–8.5)
RBC # BLD AUTO: 2.27 10*6/MM3 (ref 3.77–5.28)
SODIUM SERPL-SCNC: 134 MMOL/L (ref 136–145)
T4 FREE SERPL-MCNC: 1.31 NG/DL (ref 0.93–1.7)
TRIGL SERPL-MCNC: 56 MG/DL (ref 0–150)
TSH SERPL DL<=0.005 MIU/L-ACNC: 2.99 UIU/ML (ref 0.27–4.2)
VIT B12 SERPL-MCNC: >2000 PG/ML (ref 211–946)
VLDLC SERPL CALC-MCNC: 13 MG/DL (ref 5–40)
WBC # BLD AUTO: 5.01 10*3/MM3 (ref 3.4–10.8)

## 2022-08-17 NOTE — TELEPHONE ENCOUNTER
Please call patient and see if she can get additional lab work today- she has severe anemia  Thanks, layne

## 2022-08-17 NOTE — PROGRESS NOTES
Please let her know that she is very anemic.  We need to do additional lab work and have her see a blood specialist  We will order the lab work and make the referral

## 2022-08-18 ENCOUNTER — LAB (OUTPATIENT)
Dept: LAB | Facility: HOSPITAL | Age: 82
End: 2022-08-18

## 2022-08-18 ENCOUNTER — LAB (OUTPATIENT)
Dept: ENDOCRINOLOGY | Facility: CLINIC | Age: 82
End: 2022-08-18

## 2022-08-18 DIAGNOSIS — E04.0 DIFFUSE GOITER: ICD-10-CM

## 2022-08-18 DIAGNOSIS — I10 ESSENTIAL HYPERTENSION: ICD-10-CM

## 2022-08-18 DIAGNOSIS — D64.9 ANEMIA, UNSPECIFIED TYPE: Primary | ICD-10-CM

## 2022-08-18 DIAGNOSIS — E83.52 HYPERCALCEMIA: ICD-10-CM

## 2022-08-18 DIAGNOSIS — E55.9 VITAMIN D DEFICIENCY: ICD-10-CM

## 2022-08-19 LAB
BASOPHILS # BLD AUTO: ABNORMAL 10*3/UL
BASOPHILS # BLD MANUAL: 0 10*3/MM3 (ref 0–0.2)
BASOPHILS NFR BLD MANUAL: 0 % (ref 0–1.5)
DIFFERENTIAL COMMENT: NORMAL
EOSINOPHIL # BLD AUTO: ABNORMAL 10*3/UL
EOSINOPHIL # BLD MANUAL: 0.13 10*3/MM3 (ref 0–0.4)
EOSINOPHIL NFR BLD AUTO: ABNORMAL %
EOSINOPHIL NFR BLD MANUAL: 3 % (ref 0.3–6.2)
ERYTHROCYTE [DISTWIDTH] IN BLOOD BY AUTOMATED COUNT: 18.5 % (ref 12.3–15.4)
FERRITIN SERPL-MCNC: 684 NG/ML (ref 13–150)
FOLATE SERPL-MCNC: >20 NG/ML (ref 4.78–24.2)
HCT VFR BLD AUTO: 22.7 % (ref 34–46.6)
HGB BLD-MCNC: 7.7 G/DL (ref 12–15.9)
IRON SERPL-MCNC: 185 MCG/DL (ref 37–145)
LYMPHOCYTES # BLD AUTO: ABNORMAL 10*3/UL
LYMPHOCYTES # BLD MANUAL: 1.61 10*3/MM3 (ref 0.7–3.1)
LYMPHOCYTES NFR BLD AUTO: ABNORMAL %
LYMPHOCYTES NFR BLD MANUAL: 37 % (ref 19.6–45.3)
MCH RBC QN AUTO: 35.8 PG (ref 26.6–33)
MCHC RBC AUTO-ENTMCNC: 33.9 G/DL (ref 31.5–35.7)
MCV RBC AUTO: 105.6 FL (ref 79–97)
MONOCYTES # BLD MANUAL: 0.52 10*3/MM3 (ref 0.1–0.9)
MONOCYTES NFR BLD AUTO: ABNORMAL %
MONOCYTES NFR BLD MANUAL: 12 % (ref 5–12)
NEUTROPHILS # BLD MANUAL: 2.09 10*3/MM3 (ref 1.7–7)
NEUTROPHILS NFR BLD AUTO: ABNORMAL %
NEUTROPHILS NFR BLD MANUAL: 48 % (ref 42.7–76)
PLATELET # BLD AUTO: 285 10*3/MM3 (ref 140–450)
PLATELET BLD QL SMEAR: NORMAL
RBC # BLD AUTO: 2.15 10*6/MM3 (ref 3.77–5.28)
RBC MORPH BLD: NORMAL
RETICS/RBC NFR AUTO: 1.76 % (ref 0.7–1.9)
WBC # BLD AUTO: 4.35 10*3/MM3 (ref 3.4–10.8)

## 2022-08-19 NOTE — PROGRESS NOTES
"Patient contacted about anemia with normal iron, b12 and folate levels   She is not having any sign of bleeding  She has appt with Dr Cristobal next Wednesday  She has been on mesalamine for \"years\" to control colitis- she was told to stop this medication as of now and we will await further opinion from specialty "

## 2022-08-24 ENCOUNTER — CONSULT (OUTPATIENT)
Dept: ONCOLOGY | Facility: CLINIC | Age: 82
End: 2022-08-24

## 2022-08-24 ENCOUNTER — LAB (OUTPATIENT)
Dept: LAB | Facility: HOSPITAL | Age: 82
End: 2022-08-24

## 2022-08-24 VITALS
SYSTOLIC BLOOD PRESSURE: 144 MMHG | RESPIRATION RATE: 18 BRPM | HEART RATE: 62 BPM | WEIGHT: 119 LBS | OXYGEN SATURATION: 98 % | BODY MASS INDEX: 23.36 KG/M2 | DIASTOLIC BLOOD PRESSURE: 69 MMHG | HEIGHT: 60 IN | TEMPERATURE: 97.3 F

## 2022-08-24 DIAGNOSIS — D53.1 MEGALOBLASTIC ANEMIA: Primary | ICD-10-CM

## 2022-08-24 DIAGNOSIS — D53.1 MEGALOBLASTIC ANEMIA: ICD-10-CM

## 2022-08-24 LAB
ANION GAP SERPL CALCULATED.3IONS-SCNC: 8 MMOL/L (ref 5–15)
BASOPHILS # BLD AUTO: 0.14 10*3/MM3 (ref 0–0.2)
BASOPHILS NFR BLD AUTO: 3.3 % (ref 0–1.5)
BUN SERPL-MCNC: 11 MG/DL (ref 8–23)
BUN/CREAT SERPL: 15.9 (ref 7–25)
CALCIUM SPEC-SCNC: 10.1 MG/DL (ref 8.6–10.5)
CHLORIDE SERPL-SCNC: 103 MMOL/L (ref 98–107)
CO2 SERPL-SCNC: 26 MMOL/L (ref 22–29)
CREAT SERPL-MCNC: 0.69 MG/DL (ref 0.57–1)
DEPRECATED RDW RBC AUTO: 75.9 FL (ref 37–54)
EGFRCR SERPLBLD CKD-EPI 2021: 87.9 ML/MIN/1.73
EOSINOPHIL # BLD AUTO: 0.07 10*3/MM3 (ref 0–0.4)
EOSINOPHIL NFR BLD AUTO: 1.6 % (ref 0.3–6.2)
ERYTHROCYTE [DISTWIDTH] IN BLOOD BY AUTOMATED COUNT: 19.6 % (ref 12.3–15.4)
GLUCOSE SERPL-MCNC: 102 MG/DL (ref 65–99)
HCT VFR BLD AUTO: 24 % (ref 34–46.6)
HGB BLD-MCNC: 8.2 G/DL (ref 12–15.9)
IMM GRANULOCYTES # BLD AUTO: 0.01 10*3/MM3 (ref 0–0.05)
IMM GRANULOCYTES NFR BLD AUTO: 0.2 % (ref 0–0.5)
IRON 24H UR-MRATE: 245 MCG/DL (ref 37–145)
IRON SATN MFR SERPL: 69 % (ref 20–50)
LYMPHOCYTES # BLD AUTO: 1.1 10*3/MM3 (ref 0.7–3.1)
LYMPHOCYTES NFR BLD AUTO: 25.6 % (ref 19.6–45.3)
MCH RBC QN AUTO: 37.1 PG (ref 26.6–33)
MCHC RBC AUTO-ENTMCNC: 34.2 G/DL (ref 31.5–35.7)
MCV RBC AUTO: 108.6 FL (ref 79–97)
MONOCYTES # BLD AUTO: 0.37 10*3/MM3 (ref 0.1–0.9)
MONOCYTES NFR BLD AUTO: 8.6 % (ref 5–12)
NEUTROPHILS NFR BLD AUTO: 2.61 10*3/MM3 (ref 1.7–7)
NEUTROPHILS NFR BLD AUTO: 60.7 % (ref 42.7–76)
PLATELET # BLD AUTO: 332 10*3/MM3 (ref 140–450)
PMV BLD AUTO: 9.7 FL (ref 6–12)
POTASSIUM SERPL-SCNC: 4 MMOL/L (ref 3.5–5.2)
RBC # BLD AUTO: 2.21 10*6/MM3 (ref 3.77–5.28)
RETICS # AUTO: 0.04 10*6/MM3 (ref 0.02–0.13)
RETICS/RBC NFR AUTO: 1.92 % (ref 0.7–1.9)
SODIUM SERPL-SCNC: 137 MMOL/L (ref 136–145)
TIBC SERPL-MCNC: 358 MCG/DL (ref 298–536)
TRANSFERRIN SERPL-MCNC: 240 MG/DL (ref 200–360)
WBC NRBC COR # BLD: 4.3 10*3/MM3 (ref 3.4–10.8)

## 2022-08-24 PROCEDURE — 84466 ASSAY OF TRANSFERRIN: CPT

## 2022-08-24 PROCEDURE — 85045 AUTOMATED RETICULOCYTE COUNT: CPT

## 2022-08-24 PROCEDURE — 82668 ASSAY OF ERYTHROPOIETIN: CPT

## 2022-08-24 PROCEDURE — 83540 ASSAY OF IRON: CPT

## 2022-08-24 PROCEDURE — 36415 COLL VENOUS BLD VENIPUNCTURE: CPT

## 2022-08-24 PROCEDURE — 85025 COMPLETE CBC W/AUTO DIFF WBC: CPT

## 2022-08-24 PROCEDURE — 80048 BASIC METABOLIC PNL TOTAL CA: CPT

## 2022-08-24 PROCEDURE — 99204 OFFICE O/P NEW MOD 45 MIN: CPT | Performed by: INTERNAL MEDICINE

## 2022-08-24 RX ORDER — ANTIARTHRITIC COMBINATION NO.2 900 MG
TABLET ORAL
COMMUNITY

## 2022-08-24 RX ORDER — LANOLIN ALCOHOL/MO/W.PET/CERES
1000 CREAM (GRAM) TOPICAL DAILY
COMMUNITY

## 2022-08-24 NOTE — PROGRESS NOTES
ID: 82 y.o. year old female from [facility] [id number]    PCP: Viola Wood DO    REFERRING PHYSICIAN: Cynthia Farmer MD    Reason for Consultation: Megaloblastic anemia    Dear Dr. Eubanks and Dr. Wood    It is a pleasure to meet Mrs. Golden today.  She is a very pleasant 82-year-old lady who presents today for consultation for a megaloblastic anemia.  Over the last year she is dropped her hemoglobin by about 5 points.  Her MCV is elevated at 106.  She has normal folate and B12 levels.  Her ferritin is elevated.  She denies any obvious blood loss.  She does not have an elevated reticulocyte count.  She denies any recent infections.  Denies any issues with fevers.      Past Medical History:   Diagnosis Date   • Ankle fracture, right 12/2016   • GERD (gastroesophageal reflux disease)    • Knee pain    • Lower back pain        Past Surgical History:   Procedure Laterality Date   • BASAL CELL CARCINOMA EXCISION     • BLADDER REPAIR     • BREAST CYST ASPIRATION Right ~1999   • HYSTERECTOMY  1993   • OOPHORECTOMY Bilateral 1993       Social History     Socioeconomic History   • Marital status:    Tobacco Use   • Smoking status: Never Smoker   • Smokeless tobacco: Never Used   Vaping Use   • Vaping Use: Never used   Substance and Sexual Activity   • Alcohol use: No   • Drug use: No   • Sexual activity: Defer       Family History   Problem Relation Age of Onset   • Heart disease Mother    • Heart disease Father    • Cancer Father         colon   • Alcohol abuse Father    • Hypertension Sister    • Cancer Brother    • Heart disease Brother    • Breast cancer Neg Hx    • Ovarian cancer Neg Hx        Review of Systems:    16 point review of systems was performed and reviewed and scanned into the EMR    Review of Systems - Oncology      Current Outpatient Medications:   •  amLODIPine (NORVASC) 5 MG tablet, Take 1 tablet by mouth Every Night., Disp: 90 tablet, Rfl: 1  •  ascorbic acid (VITAMIN C) 1000 MG  tablet, Take 1,000 mg by mouth Daily., Disp: , Rfl:   •  aspirin 81 MG tablet, Take 1 tablet by mouth daily., Disp: , Rfl:   •  Biotin 5000 MCG tablet, Take  by mouth., Disp: , Rfl:   •  fluticasone (FLONASE) 50 MCG/ACT nasal spray, , Disp: , Rfl:   •  memantine (NAMENDA) 5 MG tablet, Take 1 tablet by mouth 2 (Two) Times a Day., Disp: 180 tablet, Rfl: 1  •  Misc Natural Products (OSTEO BI-FLEX TRIPLE STRENGTH) tablet, Take  by mouth., Disp: , Rfl:   •  Multiple Vitamins-Minerals (CENTRUM SILVER) tablet, Take 1 tablet by mouth daily., Disp: , Rfl:   •  Omega-3 Fatty Acids (FISH OIL) 1200 MG capsule capsule, Take 1 capsule by mouth daily., Disp: , Rfl:   •  pantoprazole (PROTONIX) 40 MG EC tablet, Take 1 tablet by mouth Daily. Take one po q day, Disp: 90 tablet, Rfl: 1  •  ramipril (ALTACE) 10 MG capsule, Take 1 capsule by mouth Every 12 (Twelve) Hours., Disp: 180 capsule, Rfl: 1  •  vitamin B-12 (CYANOCOBALAMIN) 1000 MCG tablet, Take 1,000 mcg by mouth Daily., Disp: , Rfl:     Pain Medications             aspirin 81 MG tablet Take 1 tablet by mouth daily.           Allergies   Allergen Reactions   • Latex Other (See Comments)     Redness from adhesive tape         ECOG score: 0           Objective     Vitals:    08/24/22 1056   BP: 144/69   Pulse: 62   Resp: 18   Temp: 97.3 °F (36.3 °C)   SpO2: 98%     Body mass index is 23.05 kg/m².  Body surface area is 1.5 meters squared.        08/24/22  1056   Weight: 54 kg (119 lb)     Pain Score    08/24/22 1056   PainSc: 0-No pain          Physical Exam    General: well appearing, in no acute distress  HEENT: sclera anicteric, oropharynx clear, neck is supple  Lymphatics: no cervical, supraclavicular, or axillary adenopathy  Cardiovascular: regular rate and rhythm, no murmurs, rubs or gallops  Lungs: clear to auscultation bilaterally  Abdomen: soft, nontender, nondistended.  No palpable organomegaly  Extremities: no lower extremity edema  Skin: no rashes, lesions, bruising,  or petechiae  Msk:  Shows no weakness of the large muscle groups  Psych: Mood is stable        Lab Results   Component Value Date    GLUCOSE 88 08/16/2022    BUN 13 08/16/2022    CREATININE 0.67 08/16/2022     (L) 08/16/2022    K 4.5 08/16/2022    CL 99 08/16/2022    CO2 24.9 08/16/2022    CALCIUM 9.5 08/16/2022    PROTEINTOT 6.8 06/10/2021    ALBUMIN 4.70 08/16/2022    BILITOT 1.4 (H) 08/16/2022    ALKPHOS 49 08/16/2022    AST 14 08/16/2022    ALT 10 08/16/2022       Lab Results   Component Value Date    HGB 7.7 (L) 08/18/2022    HCT 22.7 (L) 08/18/2022    .6 (H) 08/18/2022     08/18/2022    WBC 4.35 08/18/2022    NEUTROABS 2.09 08/18/2022    LYMPHSABS CANCELED 08/18/2022    LYMPHSABS 1.61 08/18/2022    MONOSABS 0.52 08/18/2022    EOSABS CANCELED 08/18/2022    EOSABS 3.0 08/18/2022    EOSABS 0.13 08/18/2022    BASOSABS CANCELED 08/18/2022    BASOSABS 0.00 08/18/2022     Lab Results   Component Value Date    HGB 7.7 (L) 08/18/2022    HGB 8.0 (L) 08/16/2022    HGB 9.7 (L) 06/10/2021     Lab Results   Component Value Date    FERRITIN 684.00 (H) 08/18/2022     Lab Results   Component Value Date    .6 (H) 08/18/2022    .8 (H) 08/16/2022     (H) 06/10/2021     No results found for: TIBC  No results found for: LABIRON  No results found for: IRON    Lab Results   Component Value Date    DOTQPGSW80 >2000 (H) 08/16/2022     Lab Results   Component Value Date    FOLATE >20.00 08/18/2022       Erythropoetin Level    No results found for: EPOREFLAB          Assessment      1.  Megaloblastic anemia.  I reviewed the causes of anemia with Ms. Golden and her  today.  I am concerned that she has an underlying myelodysplastic syndrome that is emerging.  This would be consistent with a megaloblastic process.  I have recommended a bone marrow biopsy on her.  I am going to recheck her labs today.  We may have to consider transfusion support if she gets below 7.  She may be able to get it  done closer to home in [place].  For now I will do the bone marrow biopsy and see what we are dealing with before we consider course going forward.  This does not appear to be a deficiency issue or a blood loss issue.          Thank you for allowing me to participate in the care of this patient.    Yours sincerely,    Ashia Cristobal MD  [facility]  Hematology and Oncology         Orders Placed This Encounter   Procedures   • CT Guided Bone Marrow Biopsy And Aspiration     Standing Status:   Future     Standing Expiration Date:   8/24/2023     Order Specific Question:   Reason for Exam:     Answer:   megaloblastic Anemia     Order Specific Question:   Release to patient     Answer:   Routine Release   • Erythropoietin     Standing Status:   Future     Standing Expiration Date:   8/24/2023     Order Specific Question:   Release to patient     Answer:   Routine Release   • Iron Profile     Standing Status:   Future     Standing Expiration Date:   8/24/2023     Order Specific Question:   Release to patient     Answer:   Routine Release   • Reticulocytes     Standing Status:   Future     Standing Expiration Date:   8/24/2023     Order Specific Question:   Release to patient     Answer:   Immediate     Order Specific Question:   Release to patient     Answer:   Routine Release   • Basic Metabolic Panel     Standing Status:   Future     Standing Expiration Date:   8/24/2023     Order Specific Question:   Release to patient     Answer:   Routine Release   • CBC & Differential     Standing Status:   Future     Standing Expiration Date:   8/24/2023     Order Specific Question:   Manual Differential     Answer:   No     Order Specific Question:   Release to patient     Answer:   Routine Release

## 2022-08-25 LAB — EPO SERPL-ACNC: 69.9 MIU/ML (ref 2.6–18.5)

## 2022-09-09 ENCOUNTER — HOSPITAL ENCOUNTER (OUTPATIENT)
Dept: CT IMAGING | Facility: HOSPITAL | Age: 82
Discharge: HOME | End: 2022-09-09
Admitting: RADIOLOGY

## 2022-09-09 VITALS
TEMPERATURE: 97.4 F | SYSTOLIC BLOOD PRESSURE: 109 MMHG | RESPIRATION RATE: 16 BRPM | BODY MASS INDEX: 23.49 KG/M2 | HEIGHT: 61 IN | HEART RATE: 64 BPM | OXYGEN SATURATION: 100 % | DIASTOLIC BLOOD PRESSURE: 53 MMHG | WEIGHT: 124.4 LBS

## 2022-09-09 DIAGNOSIS — D53.1 MEGALOBLASTIC ANEMIA: ICD-10-CM

## 2022-09-09 LAB
ANISOCYTOSIS BLD QL: NORMAL
BASOPHILS # BLD AUTO: 0.12 10*3/MM3 (ref 0–0.2)
BASOPHILS NFR BLD AUTO: 2.6 % (ref 0–1.5)
DEPRECATED RDW RBC AUTO: 71.7 FL (ref 37–54)
EOSINOPHIL # BLD AUTO: 0.12 10*3/MM3 (ref 0–0.4)
EOSINOPHIL NFR BLD AUTO: 2.6 % (ref 0.3–6.2)
ERYTHROCYTE [DISTWIDTH] IN BLOOD BY AUTOMATED COUNT: 20 % (ref 12.3–15.4)
HCT VFR BLD AUTO: 23.2 % (ref 34–46.6)
HGB BLD-MCNC: 8.2 G/DL (ref 12–15.9)
IMM GRANULOCYTES # BLD AUTO: 0.03 10*3/MM3 (ref 0–0.05)
IMM GRANULOCYTES NFR BLD AUTO: 0.6 % (ref 0–0.5)
LYMPHOCYTES # BLD AUTO: 1.17 10*3/MM3 (ref 0.7–3.1)
LYMPHOCYTES NFR BLD AUTO: 25.3 % (ref 19.6–45.3)
MACROCYTES BLD QL SMEAR: NORMAL
MCH RBC QN AUTO: 36.3 PG (ref 26.6–33)
MCHC RBC AUTO-ENTMCNC: 35.3 G/DL (ref 31.5–35.7)
MCV RBC AUTO: 102.7 FL (ref 79–97)
MONOCYTES # BLD AUTO: 0.52 10*3/MM3 (ref 0.1–0.9)
MONOCYTES NFR BLD AUTO: 11.3 % (ref 5–12)
NEUTROPHILS NFR BLD AUTO: 2.66 10*3/MM3 (ref 1.7–7)
NEUTROPHILS NFR BLD AUTO: 57.6 % (ref 42.7–76)
NRBC BLD AUTO-RTO: 0.9 /100 WBC (ref 0–0.2)
PLAT MORPH BLD: NORMAL
PLATELET # BLD AUTO: 324 10*3/MM3 (ref 140–450)
PMV BLD AUTO: 11.1 FL (ref 6–12)
RBC # BLD AUTO: 2.26 10*6/MM3 (ref 3.77–5.28)
WBC MORPH BLD: NORMAL
WBC NRBC COR # BLD: 4.62 10*3/MM3 (ref 3.4–10.8)

## 2022-09-09 PROCEDURE — 88341 IMHCHEM/IMCYTCHM EA ADD ANTB: CPT | Performed by: INTERNAL MEDICINE

## 2022-09-09 PROCEDURE — 25010000002 MIDAZOLAM PER 1 MG: Performed by: RADIOLOGY

## 2022-09-09 PROCEDURE — 88313 SPECIAL STAINS GROUP 2: CPT | Performed by: INTERNAL MEDICINE

## 2022-09-09 PROCEDURE — 25010000002 FENTANYL CITRATE (PF) 50 MCG/ML SOLUTION: Performed by: RADIOLOGY

## 2022-09-09 PROCEDURE — 85007 BL SMEAR W/DIFF WBC COUNT: CPT | Performed by: RADIOLOGY

## 2022-09-09 PROCEDURE — 88342 IMHCHEM/IMCYTCHM 1ST ANTB: CPT | Performed by: INTERNAL MEDICINE

## 2022-09-09 PROCEDURE — 77012 CT SCAN FOR NEEDLE BIOPSY: CPT

## 2022-09-09 PROCEDURE — 99152 MOD SED SAME PHYS/QHP 5/>YRS: CPT

## 2022-09-09 PROCEDURE — 88311 DECALCIFY TISSUE: CPT | Performed by: INTERNAL MEDICINE

## 2022-09-09 PROCEDURE — A9270 NON-COVERED ITEM OR SERVICE: HCPCS | Performed by: RADIOLOGY

## 2022-09-09 PROCEDURE — 63710000001 HYDROCODONE-ACETAMINOPHEN 5-325 MG TABLET: Performed by: RADIOLOGY

## 2022-09-09 PROCEDURE — 88305 TISSUE EXAM BY PATHOLOGIST: CPT | Performed by: INTERNAL MEDICINE

## 2022-09-09 PROCEDURE — 0 LIDOCAINE 1 % SOLUTION: Performed by: RADIOLOGY

## 2022-09-09 PROCEDURE — 85025 COMPLETE CBC W/AUTO DIFF WBC: CPT | Performed by: RADIOLOGY

## 2022-09-09 RX ORDER — FENTANYL CITRATE 50 UG/ML
INJECTION, SOLUTION INTRAMUSCULAR; INTRAVENOUS
Status: COMPLETED | OUTPATIENT
Start: 2022-09-09 | End: 2022-09-09

## 2022-09-09 RX ORDER — LIDOCAINE HYDROCHLORIDE 10 MG/ML
8 INJECTION, SOLUTION INFILTRATION; PERINEURAL ONCE
Status: COMPLETED | OUTPATIENT
Start: 2022-09-09 | End: 2022-09-09

## 2022-09-09 RX ORDER — SODIUM CHLORIDE 0.9 % (FLUSH) 0.9 %
10 SYRINGE (ML) INJECTION AS NEEDED
Status: DISCONTINUED | OUTPATIENT
Start: 2022-09-09 | End: 2022-09-10 | Stop reason: HOSPADM

## 2022-09-09 RX ORDER — MIDAZOLAM HYDROCHLORIDE 1 MG/ML
INJECTION INTRAMUSCULAR; INTRAVENOUS
Status: COMPLETED | OUTPATIENT
Start: 2022-09-09 | End: 2022-09-09

## 2022-09-09 RX ORDER — MIDAZOLAM HYDROCHLORIDE 1 MG/ML
INJECTION INTRAMUSCULAR; INTRAVENOUS
Status: DISPENSED
Start: 2022-09-09 | End: 2022-09-09

## 2022-09-09 RX ORDER — FENTANYL CITRATE 50 UG/ML
INJECTION, SOLUTION INTRAMUSCULAR; INTRAVENOUS
Status: DISPENSED
Start: 2022-09-09 | End: 2022-09-09

## 2022-09-09 RX ORDER — SODIUM CHLORIDE 0.9 % (FLUSH) 0.9 %
3 SYRINGE (ML) INJECTION EVERY 12 HOURS SCHEDULED
Status: DISCONTINUED | OUTPATIENT
Start: 2022-09-09 | End: 2022-09-10 | Stop reason: HOSPADM

## 2022-09-09 RX ORDER — HYDROCODONE BITARTRATE AND ACETAMINOPHEN 5; 325 MG/1; MG/1
1 TABLET ORAL EVERY 4 HOURS PRN
Status: DISCONTINUED | OUTPATIENT
Start: 2022-09-09 | End: 2022-09-10 | Stop reason: HOSPADM

## 2022-09-09 RX ADMIN — LIDOCAINE HYDROCHLORIDE 8 ML: 10 INJECTION, SOLUTION INFILTRATION; PERINEURAL at 10:13

## 2022-09-09 RX ADMIN — MIDAZOLAM HYDROCHLORIDE 0.5 MG: 1 INJECTION, SOLUTION INTRAMUSCULAR; INTRAVENOUS at 10:01

## 2022-09-09 RX ADMIN — HYDROCODONE BITARTRATE AND ACETAMINOPHEN 1 TABLET: 5; 325 TABLET ORAL at 11:29

## 2022-09-09 RX ADMIN — FENTANYL CITRATE 25 MCG: 50 INJECTION, SOLUTION INTRAMUSCULAR; INTRAVENOUS at 10:00

## 2022-09-09 RX ADMIN — MIDAZOLAM HYDROCHLORIDE 0.5 MG: 1 INJECTION, SOLUTION INTRAMUSCULAR; INTRAVENOUS at 10:05

## 2022-09-09 RX ADMIN — FENTANYL CITRATE 25 MCG: 50 INJECTION, SOLUTION INTRAMUSCULAR; INTRAVENOUS at 10:05

## 2022-09-09 RX ADMIN — MIDAZOLAM HYDROCHLORIDE 0.5 MG: 1 INJECTION, SOLUTION INTRAMUSCULAR; INTRAVENOUS at 10:07

## 2022-09-09 NOTE — NURSING NOTE
CT guided bone marrow biopsy performed by Dr Silverio. Sample obtained, labeled, and taken to lab per CT tech. Patient tolerated well. 1.5 MG Versed and 50 MCG Fentanyl given during the procedure for a sedation time of 10 minutes. Report called to RN.  DLP - 177

## 2022-09-09 NOTE — DISCHARGE INSTRUCTIONS
May remove gauze dressing tomorrow and shower.  Resume regular diet.   Take medications as ordered by your doctor.  Watch biopsy site for signs of infection ( redness, swelling , warmth) If present, report to your doctor.

## 2022-09-09 NOTE — PRE-PROCEDURE NOTE
"[facility]   Vascular Interventional Radiology  History & Physicial    Patient Name:Zainab Golden    : 1940    MRN: [id number]    Primary Care Physician: Viola Wood DO    Referring Physician: Ashia Cristobal MD     Date of admission: 2022    Subjective     Reason for Consult: BMBx    History of Present Illness     Zainab Golden is a 82 y.o. female referred to IR as noted above.      Active Hospital Problems:  There are no active hospital problems to display for this patient.      Personal History     Past Medical History:   Diagnosis Date   • Ankle fracture, right 2016   • GERD (gastroesophageal reflux disease)    • Knee pain    • Lower back pain        Past Surgical History:   Procedure Laterality Date   • BASAL CELL CARCINOMA EXCISION     • BLADDER REPAIR     • BREAST CYST ASPIRATION Right ~   • CATARACT EXTRACTION WITH INTRAOCULAR LENS IMPLANT Bilateral 2014   • HYSTERECTOMY     • OOPHORECTOMY Bilateral    • SQUAMOUS CELL CARCINOMA EXCISION  2019    along hairline   • SQUAMOUS CELL CARCINOMA EXCISION  2017    left side of nose       Family History: Her family history includes Alcohol abuse in her father; Cancer in her brother and father; Heart disease in her brother, father, and mother; Hypertension in her sister.     Social History: She  reports that she has never smoked. She has never used smokeless tobacco. She reports that she does not drink alcohol and does not use drugs.    Home Medications:  Biotin, Osteo Bi-Flex Triple Strength, amLODIPine, ascorbic acid, aspirin, fish oil, fluticasone, memantine, multivitamin with minerals, pantoprazole, ramipril, and vitamin B-12    Current Medications:  •  fentaNYL citrate (PF)  •  midazolam  •  sodium chloride  •  sodium chloride     Allergies:  She is allergic to latex.    Review of Systems    Objective     Visit Vitals  /60   Pulse 66   Temp 97.4 °F (36.3 °C)   Resp 16   Ht 154.9 cm (61\")   Wt 56.4 kg (124 lb 6.4 oz) "   LMP  (LMP Unknown)   SpO2 96%   BMI 23.51 kg/m²        Physical Exam    A&Ox3.   Able to communicate  No Apparent Distress  Average physique       Result Review      I have personally reviewed the results from the time of this admission to 9/9/2022 09:42 EDT and agree with these findings.  [x]  Laboratory  []  Microbiology  [x]  Radiology  []  EKG/Telemetry   []  Cardiology/Vascular   []  Pathology  []  Old records  []  Other:    Most notable findings include: As noted:    Results from last 7 days   Lab Units 09/09/22  0845   HEMOGLOBIN g/dL 8.2*   HEMATOCRIT % 23.2*   PLATELETS 10*3/mm3 324       Estimated Creatinine Clearance: 57.9 mL/min (by C-G formula based on SCr of 0.69 mg/dL). No results found for: CREATININE    No results found for: COVID19     No results found for: PREGTESTUR, PREGSERUM, HCG, HCGQUANT     ASA SCALE ASSESSMENT (applicable if sedation planned):  2-Mild to moderate systemic disease, medically well controlled, with no functional limitation     MALLAMPATI CLASSIFICATION (applicable if sedation planned):  1-Able to visualize the soft palate, fauces, uvula, anterior & posterior tonsilar pillars.    Assessment / Plan     Zainab Golden is a 82 y.o. female referred to the IR service with above problem.    Plan:   As above.    Arron Silverio MD   Vascular Interventional Radiology  09/09/22   9:42 AM EDT

## 2022-09-12 ENCOUNTER — TELEPHONE (OUTPATIENT)
Dept: INFUSION THERAPY | Facility: HOSPITAL | Age: 82
End: 2022-09-12

## 2022-09-14 ENCOUNTER — OFFICE VISIT (OUTPATIENT)
Dept: ONCOLOGY | Facility: CLINIC | Age: 82
End: 2022-09-14

## 2022-09-14 VITALS
BODY MASS INDEX: 23.75 KG/M2 | HEART RATE: 67 BPM | OXYGEN SATURATION: 68 % | DIASTOLIC BLOOD PRESSURE: 64 MMHG | TEMPERATURE: 98 F | RESPIRATION RATE: 18 BRPM | WEIGHT: 121 LBS | SYSTOLIC BLOOD PRESSURE: 117 MMHG | HEIGHT: 60 IN

## 2022-09-14 DIAGNOSIS — D46.Z MDS (MYELODYSPLASTIC SYNDROME), LOW GRADE: ICD-10-CM

## 2022-09-14 DIAGNOSIS — D53.1 MEGALOBLASTIC ANEMIA: Primary | ICD-10-CM

## 2022-09-14 PROBLEM — D46.20 MDS (MYELODYSPLASTIC SYNDROME), LOW GRADE (HCC): Status: ACTIVE | Noted: 2022-09-14

## 2022-09-14 PROCEDURE — 99214 OFFICE O/P EST MOD 30 MIN: CPT | Performed by: INTERNAL MEDICINE

## 2022-09-14 NOTE — PROGRESS NOTES
PROBLEM LIST:  Oncology/Hematology History   MDS (myelodysplastic syndrome), low grade (HCC)   9/9/2022 Biopsy    Lab Results   Component Value Date    FINALDX  09/09/2022     PERIPHERAL BLOOD SMEAR, BONE MARROW ASPIRATE, CLOT SECTION AND CORE BIOPSY WITH FLOW CYTOMETRY AND IMMUNOHISTOCHEMISTRY:   Severe macrocytic anemia with rare circulating nucleated red blood cells.    Markedly hypercellular marrow (80% cellularity) with erythroid expansion, erythroid maturation dyssynchrony, occasional atypical megakaryocytes and occasional ring sideroblasts (see comment).  No reticulin fibrosis present.  No increase in blasts.    Bone marrow core biopsy containing approximately 5 mm of marrow space is available for review.  Marrow is approximately 80% cellular (markedly hypercellular for patient's age).  Expanded erythroid elements are readily evident.  Megakaryocytes appear slightly increased in number without significant clustering.  Majority of megakaryocytes show normal cytologic features with occasional small mononuclear forms are noted.  Iron stain, with appropriate control, shows stainable iron to be present with occasional ring sideroblasts identified on the decalcified core biopsy.  Reticulin stain, with appropriate control, shows no significant increase in reticulin fibers.  To further evaluate the marrow, immunohistochemical staining was undertaken.  This necessitated repeating some markers performed on flow cytometry to assess the distribution of immature cells in the marrow.  CD34 stain highlights mononuclear cells compatible with blasts which appear slightly increased in number but show no significant clustering.  Pathologist count of the CD34 positive cells shows CD34 positive blasts to represent 2.66% of the marrow cellularity.   stain highlights mononuclear cells compatible with blasts as well as some early erythroid precursors and mast cells staining approximately 5-7% of the total marrow cellularity.   P53 stain shows moderate intensity positivity in 5-7% of the marrow cellularity (normal pattern).    Cytogentics still pending          9/14/2022 Initial Diagnosis    MDS (myelodysplastic syndrome), low grade (HCC)    Erythropoeitin of 69.9 (8/24/22)         REASON FOR VISIT: New diagnosis of MDS    HISTORY OF PRESENT ILLNESS:   82 y.o.  female presents today for follow-up after undergoing a bone marrow biopsy.  I initially saw her for megaloblastic anemia and I suspected she had a low-grade MDS.  Her hemoglobin runs around 8.  She is has some fatigue issues but overall she is doing well.  She lives in [place].    Past medical history, social history and family history was reviewed 09/14/22 and unchanged from prior visit.    Review of Systems:    Review of Systems - Oncology         Medications:        Current Outpatient Medications:   •  amLODIPine (NORVASC) 5 MG tablet, Take 1 tablet by mouth Every Night., Disp: 90 tablet, Rfl: 1  •  ascorbic acid (VITAMIN C) 1000 MG tablet, Take 1,000 mg by mouth Daily., Disp: , Rfl:   •  aspirin 81 MG tablet, Take 1 tablet by mouth daily., Disp: , Rfl:   •  Biotin 5000 MCG tablet, Take  by mouth., Disp: , Rfl:   •  fluticasone (FLONASE) 50 MCG/ACT nasal spray, , Disp: , Rfl:   •  memantine (NAMENDA) 5 MG tablet, Take 1 tablet by mouth 2 (Two) Times a Day., Disp: 180 tablet, Rfl: 1  •  Misc Natural Products (OSTEO BI-FLEX TRIPLE STRENGTH) tablet, Take  by mouth., Disp: , Rfl:   •  Multiple Vitamins-Minerals (CENTRUM SILVER) tablet, Take 1 tablet by mouth daily., Disp: , Rfl:   •  mupirocin (BACTROBAN) 2 % ointment, USING A Q-TIP, APPLY THIN LAYER TO INSIDE OF BOTH NOSTRILS ONCE DAILY FOR 5 DAYS PRIOR TO SURGERY AND ONE TIME ON THE MORNING OF SURGERY, Disp: , Rfl:   •  Omega-3 Fatty Acids (FISH OIL) 1200 MG capsule capsule, Take 1 capsule by mouth daily., Disp: , Rfl:   •  pantoprazole (PROTONIX) 40 MG EC tablet, Take 1 tablet by mouth Daily. Take one po q day, Disp: 90 tablet,  "Rfl: 1  •  ramipril (ALTACE) 10 MG capsule, Take 1 capsule by mouth Every 12 (Twelve) Hours., Disp: 180 capsule, Rfl: 1  •  vitamin B-12 (CYANOCOBALAMIN) 1000 MCG tablet, Take 1,000 mcg by mouth Daily., Disp: , Rfl:     Pain Medications             aspirin 81 MG tablet Take 1 tablet by mouth daily.             ALLERGIES:    Allergies   Allergen Reactions   • Latex Other (See Comments)     Redness from adhesive tape         Physical Exam    VITAL SIGNS:  /64 Comment: LUE  Pulse 67   Temp 98 °F (36.7 °C) (Infrared)   Resp 18   Ht 153 cm (60.25\")   Wt 54.9 kg (121 lb)   LMP  (LMP Unknown)   SpO2 (!) 68% Comment: RA  BMI 23.44 kg/m²     ECOG score: 0           Wt Readings from Last 3 Encounters:   09/14/22 54.9 kg (121 lb)   09/09/22 56.4 kg (124 lb 6.4 oz)   08/24/22 54 kg (119 lb)       Body mass index is 23.44 kg/m². Body surface area is 1.51 meters squared.       Performance Status: 1    General: well appearing, in no acute distress  HEENT: sclera anicteric, neck is supple  Lymphatics: no cervical, supraclavicular, or axillary adenopathy  Cardiovascular: regular rate and rhythm, no murmurs, rubs or gallops  Lungs: clear to auscultation bilaterally  Abdomen: soft, nontender, nondistended.  No palpable organomegaly  Extremities: no lower extremity edema  Skin: no rashes, lesions, bruising, or petechiae  Msk:  Shows no weakness of the large muscle groups  Psych: Mood is stable        RECENT LABS:    Lab Results   Component Value Date    HGB 8.2 (L) 09/09/2022    HCT 23.2 (L) 09/09/2022    .7 (H) 09/09/2022     09/09/2022    WBC 4.62 09/09/2022    NEUTROABS 2.66 09/09/2022    LYMPHSABS 1.17 09/09/2022    MONOSABS 0.52 09/09/2022    EOSABS 0.12 09/09/2022    BASOSABS 0.12 09/09/2022       Lab Results   Component Value Date    GLUCOSE 102 (H) 08/24/2022    BUN 11 08/24/2022    CREATININE 0.69 08/24/2022     08/24/2022    K 4.0 08/24/2022     08/24/2022    CO2 26.0 08/24/2022    " CALCIUM 10.1 08/24/2022    PROTEINTOT 6.8 06/10/2021    ALBUMIN 4.70 08/16/2022    BILITOT 1.4 (H) 08/16/2022    ALKPHOS 49 08/16/2022    AST 14 08/16/2022    ALT 10 08/16/2022       CT Bone marrow biopsy and aspiration    Result Date: 9/9/2022  Impression:                                                              Successful CT-guided bone marrow aspiration and bone biopsy using the right posterior superior iliac spine access under CT guidance as described above.  Thank you for the opportunity to assist in the care of your patient.  This report was finalized on 9/9/2022 10:35 AM by Arron Silverio MD.      Lab Results   Component Value Date    HGB 8.2 (L) 09/09/2022    HGB 8.2 (L) 08/24/2022    HGB 7.7 (L) 08/18/2022     Lab Results   Component Value Date    FERRITIN 684.00 (H) 08/18/2022     Lab Results   Component Value Date    .7 (H) 09/09/2022    .6 (H) 08/24/2022    .6 (H) 08/18/2022     Lab Results   Component Value Date    TIBC 358 08/24/2022     Lab Results   Component Value Date    LABIRON 69 (H) 08/24/2022     Lab Results   Component Value Date    IRON 245 (H) 08/24/2022       Lab Results   Component Value Date    ZRQRUYAX08 >2000 (H) 08/16/2022     Lab Results   Component Value Date    FOLATE >20.00 08/18/2022       Erythropoetin Level    Lab Results   Component Value Date    EPOREFLAB 69.9 (H) 08/24/2022             Assessment/Plan    1.  Low-grade myelodysplastic syndrome.  I reviewed her pathology with her today.  I felt the most likely diagnosis here is a low-grade MDS.  I spoke to her the options going forward including routine transfusion support if hemoglobin less than 7 or considering Aranesp or Procrit as a way to maintain her hemoglobin at a reasonable level.  This is not curable.  I do not feel that she would need a HypoMethylating Agents at this time.  She has not really transfusion dependent.  I am going to have her see Dr. Valentin in [place].  He can discuss treatment  options going forward there.  This would make her need for travel much less.        Total time of patient care on day of service including time prior to, face to face with patient, and following visit spent in reviewing records, lab results, Pathology,  discussion with patient, and documentation/charting was > 32 minutes.     Ashia Cristobal MD  [facility]emat[facility] and Oncology         Orders Placed This Encounter   Procedures   • Ambulatory Referral to Oncology       9/14/2022

## 2022-09-22 LAB
CYTO UR: NORMAL
LAB AP ASPIRATE SMEAR: NORMAL
LAB AP CASE REPORT: NORMAL
LAB AP CBC AND DIFFERENTIAL: NORMAL
LAB AP CLINICAL INFORMATION: NORMAL
LAB AP CLOT SECTION: NORMAL
LAB AP CORE BIOPSY: NORMAL
LAB AP DIAGNOSIS COMMENT: NORMAL
LAB AP FLOW CYTOMETRY SUMMARY: NORMAL
LAB AP INTEGRATED ONCOLOGY, ADDENDUM 2: NORMAL
LAB AP INTEGRATED ONCOLOGY, ADDENDUM: NORMAL
PATH REPORT.FINAL DX SPEC: NORMAL

## 2022-09-24 NOTE — ASSESSMENT & PLAN NOTE
Update total calcium, vitamin D    For information on Fall & Injury Prevention, visit: [email or web address] OR  [email or web address] OR  https://www.cdc.gov/steadi/patient.html

## 2022-10-10 ENCOUNTER — TELEPHONE (OUTPATIENT)
Dept: ONCOLOGY | Facility: CLINIC | Age: 82
End: 2022-10-10

## 2022-10-10 NOTE — TELEPHONE ENCOUNTER
Returned call to patient. Informing her that she may continue on her medication. ( Patient not on any treatment)

## 2022-12-06 NOTE — TELEPHONE ENCOUNTER
Caller: Zainab Golden    Relationship: Self    Best call back number: [phone]    What is the best time to reach you: ANYTIME    Who are you requesting to speak with (clinical staff, provider,  specific staff member): DR BAKER OR NURSE    What was the call regarding: PT STATED DR BAKER HAD MADE SOME CHANGES TO HER MEDS - FLONASE, BACTROBAN, AND ONE OTHER MED - AND SHE NEEDS TO KNOW IF SHE WAS SUPPOSED TO STOP TEMPORARILY OR WHEN TO START BACK.    PLEASE CALL TO ADVISE.     Do you require a callback: YES   Ivermectin Counseling:  Patient instructed to take medication on an empty stomach with a full glass of water.  Patient informed of potential adverse effects including but not limited to nausea, diarrhea, dizziness, itching, and swelling of the extremities or lymph nodes.  The patient verbalized understanding of the proper use and possible adverse effects of ivermectin.  All of the patient's questions and concerns were addressed.

## 2023-02-01 ENCOUNTER — TRANSCRIBE ORDERS (OUTPATIENT)
Dept: ADMINISTRATIVE | Facility: HOSPITAL | Age: 83
End: 2023-02-01
Payer: MEDICARE

## 2023-02-01 DIAGNOSIS — Z12.31 VISIT FOR SCREENING MAMMOGRAM: Primary | ICD-10-CM

## 2023-02-23 DIAGNOSIS — I10 ESSENTIAL HYPERTENSION: ICD-10-CM

## 2023-02-23 RX ORDER — AMLODIPINE BESYLATE 5 MG/1
5 TABLET ORAL NIGHTLY
Qty: 90 TABLET | Refills: 1 | Status: SHIPPED | OUTPATIENT
Start: 2023-02-23

## 2023-02-23 RX ORDER — RAMIPRIL 10 MG/1
10 CAPSULE ORAL EVERY 12 HOURS
Qty: 180 CAPSULE | Refills: 1 | Status: SHIPPED | OUTPATIENT
Start: 2023-02-23

## 2023-02-23 RX ORDER — PANTOPRAZOLE SODIUM 40 MG/1
TABLET, DELAYED RELEASE ORAL
Qty: 90 TABLET | Refills: 1 | Status: SHIPPED | OUTPATIENT
Start: 2023-02-23

## 2023-03-03 ENCOUNTER — HOSPITAL ENCOUNTER (OUTPATIENT)
Dept: MAMMOGRAPHY | Facility: HOSPITAL | Age: 83
Discharge: HOME | End: 2023-03-03
Admitting: OBSTETRICS & GYNECOLOGY
Payer: MEDICARE

## 2023-03-03 DIAGNOSIS — Z12.31 VISIT FOR SCREENING MAMMOGRAM: ICD-10-CM

## 2023-03-03 PROCEDURE — 77067 SCR MAMMO BI INCL CAD: CPT

## 2023-03-03 PROCEDURE — 77063 BREAST TOMOSYNTHESIS BI: CPT | Performed by: RADIOLOGY

## 2023-03-03 PROCEDURE — 77063 BREAST TOMOSYNTHESIS BI: CPT

## 2023-03-03 PROCEDURE — 77067 SCR MAMMO BI INCL CAD: CPT | Performed by: RADIOLOGY

## 2023-03-21 ENCOUNTER — OFFICE VISIT (OUTPATIENT)
Dept: ENDOCRINOLOGY | Facility: CLINIC | Age: 83
End: 2023-03-21
Payer: MEDICARE

## 2023-03-21 VITALS
BODY MASS INDEX: 23.18 KG/M2 | WEIGHT: 122.8 LBS | HEART RATE: 64 BPM | HEIGHT: 61 IN | OXYGEN SATURATION: 97 % | SYSTOLIC BLOOD PRESSURE: 128 MMHG | DIASTOLIC BLOOD PRESSURE: 60 MMHG

## 2023-03-21 DIAGNOSIS — E55.9 VITAMIN D DEFICIENCY: ICD-10-CM

## 2023-03-21 DIAGNOSIS — R73.03 PREDIABETES: Primary | ICD-10-CM

## 2023-03-21 DIAGNOSIS — I10 PRIMARY HYPERTENSION: ICD-10-CM

## 2023-03-21 DIAGNOSIS — E78.00 PURE HYPERCHOLESTEROLEMIA: ICD-10-CM

## 2023-03-21 PROCEDURE — 1159F MED LIST DOCD IN RCRD: CPT | Performed by: INTERNAL MEDICINE

## 2023-03-21 PROCEDURE — 3074F SYST BP LT 130 MM HG: CPT | Performed by: INTERNAL MEDICINE

## 2023-03-21 PROCEDURE — 99214 OFFICE O/P EST MOD 30 MIN: CPT | Performed by: INTERNAL MEDICINE

## 2023-03-21 PROCEDURE — 1160F RVW MEDS BY RX/DR IN RCRD: CPT | Performed by: INTERNAL MEDICINE

## 2023-03-21 PROCEDURE — 36415 COLL VENOUS BLD VENIPUNCTURE: CPT | Performed by: INTERNAL MEDICINE

## 2023-03-21 PROCEDURE — 3078F DIAST BP <80 MM HG: CPT | Performed by: INTERNAL MEDICINE

## 2023-03-21 NOTE — PROGRESS NOTES
Zainab Golden 82 y.o.  CC:Follow-up, Hyperlipidemia, Hypertension, MNG, and Hypokalemia    Mechoopda: Follow-up, Hyperlipidemia, Hypertension, MNG, and Hypokalemia    Anemia persistent   Goes weekly for shot and had transfusion  Gets another shot tomorrow   Seeing hematologist close to home ([place])  Is following a low fat diet and taking fish oil  bp is good taking altace 10 mg daily and norvasc  Is taking oral b12 supplement    Allergies   Allergen Reactions   • Latex Other (See Comments)     Redness from adhesive tape       Current Outpatient Medications:   •  amLODIPine (NORVASC) 5 MG tablet, TAKE 1 TABLET BY MOUTH EVERY NIGHT., Disp: 90 tablet, Rfl: 1  •  ascorbic acid (VITAMIN C) 1000 MG tablet, Take 1,000 mg by mouth Daily., Disp: , Rfl:   •  aspirin 81 MG tablet, Take 1 tablet by mouth daily., Disp: , Rfl:   •  Biotin 5000 MCG tablet, Take  by mouth., Disp: , Rfl:   •  fluticasone (FLONASE) 50 MCG/ACT nasal spray, , Disp: , Rfl:   •  memantine (NAMENDA) 5 MG tablet, Take 1 tablet by mouth 2 (Two) Times a Day., Disp: 180 tablet, Rfl: 1  •  Misc Natural Products (OSTEO BI-FLEX TRIPLE STRENGTH) tablet, Take  by mouth., Disp: , Rfl:   •  Multiple Vitamins-Minerals (CENTRUM SILVER) tablet, Take 1 tablet by mouth daily., Disp: , Rfl:   •  mupirocin (BACTROBAN) 2 % ointment, USING A Q-TIP, APPLY THIN LAYER TO INSIDE OF BOTH NOSTRILS ONCE DAILY FOR 5 DAYS PRIOR TO SURGERY AND ONE TIME ON THE MORNING OF SURGERY, Disp: , Rfl:   •  Omega-3 Fatty Acids (FISH OIL) 1200 MG capsule capsule, Take 1 capsule by mouth daily., Disp: , Rfl:   •  pantoprazole (PROTONIX) 40 MG EC tablet, TAKE 1 TABLET BY MOUTH DAILY., Disp: 90 tablet, Rfl: 1  •  ramipril (ALTACE) 10 MG capsule, TAKE 1 CAPSULE BY MOUTH EVERY 12 (TWELVE) HOURS., Disp: 180 capsule, Rfl: 1  •  vitamin B-12 (CYANOCOBALAMIN) 1000 MCG tablet, Take 1,000 mcg by mouth Daily., Disp: , Rfl:   Patient Active Problem List    Diagnosis    • MDS (myelodysplastic syndrome), low grade  ([facility]) [D46.20]    • 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]    • Prediabetes [R73.03]    • Hypercalcemia [E83.52]    • Elevated glucose [R73.09]    • Ingrown right big toenail [L60.0]    • Mild cognitive impairment [G31.84]    • Vitamin D deficiency [E55.9]    • Closed fracture of right ankle with delayed healing [S82.891G]    • Disorder of bone density and structure, unspecified [M85.9]    • Other adult osteomalacia  [M83.8]    • Acute cystitis without hematuria [N30.00]    • Benign essential hypertension [I10]    • Cardiac arrhythmia [I49.9]    • Cervical radiculopathy [M54.12]    • H/O colonoscopy [Z98.890]    • Migraine [G43.909]    • Gastroesophageal reflux disease [K21.9]    • Pure hypercholesterolemia [E78.00]    • Diffuse goiter [E04.0]    • Hypertension [I10]    • Hypokalemia [E87.6]    • Hyponatremia [E87.1]    • Knee pain [M25.569]    • Low back pain [M54.50]    • Abnormal mammogram [R92.8]    • Non-toxic multinodular goiter [E04.2]    • Sacroiliac joint somatic dysfunction [M99.04]    • Thrombocytopenia (HCC) [D69.6]    • Tick bite [W57.XXXA]    • Ulcerative colitis ([facility]) [K51.90]    • Varicose veins [I83.90]    • Vasovagal syncope [R55]    • Candidiasis of vagina [B37.31]      Review of Systems   Constitutional: Positive for activity change and fatigue. Negative for appetite change and unexpected weight change.   HENT: Negative for congestion and rhinorrhea.    Eyes: Negative for visual disturbance.   Respiratory: Negative for cough and shortness of breath.    Cardiovascular: Negative for palpitations and leg swelling.   Gastrointestinal: Negative for constipation, diarrhea and nausea.   Genitourinary: Negative for hematuria.   Musculoskeletal: Positive for arthralgias. Negative for back pain, gait problem, joint swelling and myalgias.   Skin: Negative for color change, rash and wound.   Allergic/Immunologic: Negative for environmental allergies, food allergies and immunocompromised state.   Neurological: Negative  "for dizziness, weakness and light-headedness.   Psychiatric/Behavioral: Negative for confusion, decreased concentration, dysphoric mood and sleep disturbance. The patient is not nervous/anxious.      Social History     Socioeconomic History   • Marital status:    Tobacco Use   • Smoking status: Never   • Smokeless tobacco: Never   Vaping Use   • Vaping Use: Never used   Substance and Sexual Activity   • Alcohol use: No   • Drug use: No   • Sexual activity: Defer     Family History   Problem Relation Age of Onset   • Heart disease Mother    • Heart disease Father    • Cancer Father         colon   • Alcohol abuse Father    • Hypertension Sister    • Cancer Brother    • Heart disease Brother    • Breast cancer Neg Hx    • Ovarian cancer Neg Hx      /60   Pulse 64   Ht 154.9 cm (61\")   Wt 55.7 kg (122 lb 12.8 oz)   LMP  (LMP Unknown)   SpO2 97%   BMI 23.20 kg/m²   Physical Exam  Vitals and nursing note reviewed.   Constitutional:       Appearance: Normal appearance. She is well-developed.   HENT:      Head: Normocephalic and atraumatic.   Eyes:      General: Lids are normal.      Extraocular Movements: Extraocular movements intact.      Conjunctiva/sclera: Conjunctivae normal.      Pupils: Pupils are equal, round, and reactive to light.   Neck:      Thyroid: No thyroid mass or thyromegaly.      Vascular: No carotid bruit.      Trachea: Trachea normal. No tracheal deviation.   Cardiovascular:      Rate and Rhythm: Normal rate and regular rhythm.      Pulses: Normal pulses.      Heart sounds: Normal heart sounds. No murmur heard.    No friction rub. No gallop.   Pulmonary:      Effort: Pulmonary effort is normal. No respiratory distress.      Breath sounds: Normal breath sounds. No wheezing.   Musculoskeletal:         General: Deformity present. Normal range of motion.      Cervical back: Normal range of motion and neck supple.   Lymphadenopathy:      Cervical: No cervical adenopathy.   Skin:     " General: Skin is warm and dry.      Findings: No erythema or rash.      Nails: There is no clubbing.   Neurological:      General: No focal deficit present.      Mental Status: She is alert and oriented to person, place, and time.      Cranial Nerves: No cranial nerve deficit.      Deep Tendon Reflexes: Reflexes are normal and symmetric. Reflexes normal.   Psychiatric:         Mood and Affect: Mood normal.         Speech: Speech normal.         Behavior: Behavior normal.         Thought Content: Thought content normal.         Judgment: Judgment normal.       Results for orders placed or performed during the hospital encounter of 09/09/22   CBC Auto Differential    Specimen: Blood   Result Value Ref Range    WBC 4.62 3.40 - 10.80 10*3/mm3    RBC 2.26 (L) 3.77 - 5.28 10*6/mm3    Hemoglobin 8.2 (L) 12.0 - 15.9 g/dL    Hematocrit 23.2 (L) 34.0 - 46.6 %    .7 (H) 79.0 - 97.0 fL    MCH 36.3 (H) 26.6 - 33.0 pg    MCHC 35.3 31.5 - 35.7 g/dL    RDW 20.0 (H) 12.3 - 15.4 %    RDW-SD 71.7 (H) 37.0 - 54.0 fl    MPV 11.1 6.0 - 12.0 fL    Platelets 324 140 - 450 10*3/mm3    Neutrophil % 57.6 42.7 - 76.0 %    Lymphocyte % 25.3 19.6 - 45.3 %    Monocyte % 11.3 5.0 - 12.0 %    Eosinophil % 2.6 0.3 - 6.2 %    Basophil % 2.6 (H) 0.0 - 1.5 %    Immature Grans % 0.6 (H) 0.0 - 0.5 %    Neutrophils, Absolute 2.66 1.70 - 7.00 10*3/mm3    Lymphocytes, Absolute 1.17 0.70 - 3.10 10*3/mm3    Monocytes, Absolute 0.52 0.10 - 0.90 10*3/mm3    Eosinophils, Absolute 0.12 0.00 - 0.40 10*3/mm3    Basophils, Absolute 0.12 0.00 - 0.20 10*3/mm3    Immature Grans, Absolute 0.03 0.00 - 0.05 10*3/mm3    nRBC 0.9 (H) 0.0 - 0.2 /100 WBC   Scan Slide    Specimen: Blood   Result Value Ref Range    Anisocytosis Slight/1+ None Seen    Macrocytes Slight/1+ None Seen    WBC Morphology Normal Normal    Platelet Morphology Normal Normal   Bone Marrow Exam    Specimen: A: Iliac Crest, Right - Biopsy; Bone Marrow Aspirate    [id number] 2: Iliac Crest, Right -  Biopsy; Bone Marrow Aspirate   Result Value Ref Range    Case Report       Surgical Pathology Report                         Case: [id number]                                  Authorizing Provider:  Ashia Cristobal MD   Collected:           09/09/2022 10:36 AM          Ordering Location:     [facility]   Received:            09/09/2022 10:36 AM                                 CT                                                                           Pathologist:           Robert Granados MD                                                        Specimens:   1) - Iliac Crest, Right - Biopsy, biopsy                                                            2) - Iliac Crest, Right - Biopsy                                                           [facility], Addendum 2       Cytogenetic Analysis Report  Normal Female Karyotype  46,XX[20]  Testing performed at outside laboratory. See scanned report.       [facility], Addendum       Fluorescence in-situ Hybridization Report  TargetGene Analysis  No assay specific abnormalities detected by MDS Panel  Testing performed at outside laboratory. See scanned report.        Clinical Information       Megaloblastic anemia      Final Diagnosis       PERIPHERAL BLOOD SMEAR, BONE MARROW ASPIRATE, CLOT SECTION AND CORE BIOPSY WITH FLOW CYTOMETRY AND IMMUNOHISTOCHEMISTRY:   Severe macrocytic anemia with rare circulating nucleated red blood cells.    Markedly hypercellular marrow (80% cellularity) with erythroid expansion, erythroid maturation dyssynchrony, occasional atypical megakaryocytes and occasional ring sideroblasts (see comment).  No reticulin fibrosis present.  No increase in blasts.      Comment       There is a marked expansion of erythroid elements in a hypercellular marrow along with slight maturation dyssynchrony.  Occasional atypical megakaryocytes are noted.  Occasional ring sideroblasts are noted as well.  There is no  significant increase in blasts.  Findings present raise the possibility of a low-grade myelodysplastic syndrome, although nonclonal causes of macrocytic anemia with reactive erythroid expansion cannot be excluded.  Cytogenetic and FISH analysis is pending to help further evaluate this differential.      Microscopic Description       The slides are reviewed and demonstrate histopathologic features supporting the above rendered diagnosis.        Flow Cytometry Summary       INTERPRETATION:  No specific immunophenotypic abnormalities identified.  No clonal B-cell population or aberrant T-cell population identified.    The following antibodies were evaluated by flow cytometry: CD2, CD3, CD4, CD5, CD7, CD8, CD10, CD11b, CD13, CD14, CD16, CD19, CD20, CD33, CD34, CD38, CD45, CD56, CD57, CD64, , HLA-DR, kappa, lambda    Gating on CD45 versus side scatter as well as incorporating other immunophenotypic data shows a distribution of cells of 5% lymphocytes, less than 0.1% hematogones, 83.3% neutrophils and neutrophil precursors, 2.8% eosinophils, 1.2% basophils, 0.7% blasts, 4% monocytes and monocytic precursors, less than 0.1% plasma cells and 1.4% CD45 negative events including nucleated erythroid precursors and debris.  No specific immunophenotypic abnormalities are noted in the maturing myeloid or monocytic elements.  In the lymphocyte gate, T cells are 85% of events, natural killer cells 8% of events and B-cells 3% of events.  The T cells show an inverted CD4: CD8 ratio of 1:2.  No aberrant T-cell antigen expression is detected.  No evidence of clonality is noted in the limited B cells present.    Technical component performed at [facility], [address] [place] [address], [facility] [address], [place] [address].     These tests use analyte specific reagents. The performance of these analyte specific reagents was determined by [facility]. These tests have not been cleared or approved  by the U.S. Food & Drug Administration (FDA). The FDA has determined that such approval is not necessary. These tests are used for clinical purposes and should not be considered experimental or research.    Professional interpretation of flow cytometric results performed by Dr. Granados.       CBC and Differential       Felix stained peripheral blood smear and accompanying CBC data is available for review.  There is a normal total white blood cell count with differential compatible with the differential reported.  No abnormal/immature white blood cells are noted.  Red blood cells are macrocytic with mild anisopoikilocytosis including elliptocytes and occasional spherocytes.  Occasional circulating nucleated red blood cells are noted.  Platelets are present in adequate numbers with overall normal appearance.  No platelet clumping is identified.      Aspirate Smear       Felix stained bone marrow aspirate smears containing adequate cellularity are available for review.  Myelopoiesis appears unremarkable.  There is a significant increase in erythropoiesis.  The maturing erythroid elements show nuclear/cytoplasmic maturation dyssynchrony and appear shifted toward immature forms.  No significant nuclear irregularities are noted in the maturing erythroids.  Rare binucleate forms are noted.  The myeloid to erythroid ratio is inverted at approximately 1:3.  Megakaryocytes are present in adequate numbers.  Frequent mononuclear forms are noted including small mononuclear forms.  No increase in blasts, lymphocytes or plasma cells is noted.  Iron stain, with appropriate control, shows stainable iron to be present with frequent ring sideroblasts, although the smears taken for iron stain are hemodilute.       Core Biopsy          Bone marrow core biopsy containing approximately 5 mm of marrow space is available for review.  Marrow is approximately 80% cellular (markedly hypercellular for patient's age).  Expanded erythroid  elements are readily evident.  Megakaryocytes appear slightly increased in number without significant clustering.  Majority of megakaryocytes show normal cytologic features with occasional small mononuclear forms are noted.  Iron stain, with appropriate control, shows stainable iron to be present with occasional ring sideroblasts identified on the decalcified core biopsy.  Reticulin stain, with appropriate control, shows no significant increase in reticulin fibers.  To further evaluate the marrow, immunohistochemical staining was undertaken.  This necessitated repeating some markers performed on flow cytometry to assess the distribution of immature cells in the marrow.  CD34 stain highlights mononuclear cells compatible with blasts which appear slightly increased in number but show no significant clustering.  Pathologist count of the CD34 positive cells shows CD34 positive blasts to represent 2.66% of the marrow cellularity.   stain highlights mononuclear cells compatible with blasts as well as some early erythroid precursors and mast cells staining approximately 5-7% of the total marrow cellularity.  P53 stain shows moderate intensity positivity in 5-7% of the marrow cellularity (normal pattern).      Clot Section       Bone marrow clot section consisting of fibrin and embedded inflammatory cells is reviewed on multiple levels.  No intact marrow particles are identified.       Diagnoses and all orders for this visit:    1. Prediabetes (Primary)  Assessment & Plan:  Check fructosamine- getting blood and iron       Orders:  -     Fructosamine; Future  -     Fructosamine    2. Primary hypertension  Assessment & Plan:  bp is good   Continue ramapril and amlodipine     Orders:  -     Comprehensive Metabolic Panel; Future  -     Comprehensive Metabolic Panel    3. Pure hypercholesterolemia  Assessment & Plan:  Is eating low fat diet  Check flp     Orders:  -     Lipid Panel; Future  -     TSH; Future  -     T4,  Free; Future  -     T4, Free  -     TSH  -     Lipid Panel    4. Vitamin D deficiency  Assessment & Plan:  Is taking vitamin D   Check levels     Orders:  -     Vitamin D,25-Hydroxy; Future  -     Vitamin D,25-Hydroxy  Return in about 6 months (around 9/21/2023) for Recheck.    Cynthia Farmer MD  Signed Cynthia Farmer MD

## 2023-03-22 LAB
25(OH)D3+25(OH)D2 SERPL-MCNC: 49.1 NG/ML (ref 30–100)
ALBUMIN SERPL-MCNC: 4.4 G/DL (ref 3.7–4.7)
ALBUMIN/GLOB SERPL: 2 {RATIO} (ref 1.2–2.2)
ALP SERPL-CCNC: 56 IU/L (ref 44–121)
ALT SERPL-CCNC: 18 IU/L (ref 0–32)
AST SERPL-CCNC: 20 IU/L (ref 0–40)
BILIRUB SERPL-MCNC: 1.1 MG/DL (ref 0–1.2)
BUN SERPL-MCNC: 17 MG/DL (ref 8–27)
BUN/CREAT SERPL: 27 (ref 12–28)
CALCIUM SERPL-MCNC: 9.6 MG/DL (ref 8.7–10.3)
CHLORIDE SERPL-SCNC: 98 MMOL/L (ref 96–106)
CHOLEST SERPL-MCNC: 95 MG/DL (ref 100–199)
CO2 SERPL-SCNC: 24 MMOL/L (ref 20–29)
CREAT SERPL-MCNC: 0.62 MG/DL (ref 0.57–1)
EGFRCR SERPLBLD CKD-EPI 2021: 90 ML/MIN/1.73
FRUCTOSAMINE SERPL-SCNC: 259 UMOL/L (ref 0–285)
GLOBULIN SER CALC-MCNC: 2.2 G/DL (ref 1.5–4.5)
GLUCOSE SERPL-MCNC: 91 MG/DL (ref 70–99)
HDLC SERPL-MCNC: 35 MG/DL
LDLC SERPL CALC-MCNC: 46 MG/DL (ref 0–99)
POTASSIUM SERPL-SCNC: 4.4 MMOL/L (ref 3.5–5.2)
PROT SERPL-MCNC: 6.6 G/DL (ref 6–8.5)
SODIUM SERPL-SCNC: 133 MMOL/L (ref 134–144)
T4 FREE SERPL-MCNC: 1.3 NG/DL (ref 0.82–1.77)
TRIGL SERPL-MCNC: 62 MG/DL (ref 0–149)
TSH SERPL DL<=0.005 MIU/L-ACNC: 2.04 UIU/ML (ref 0.45–4.5)
VLDLC SERPL CALC-MCNC: 14 MG/DL (ref 5–40)

## 2023-08-23 DIAGNOSIS — I10 ESSENTIAL HYPERTENSION: ICD-10-CM

## 2023-08-23 RX ORDER — AMLODIPINE BESYLATE 5 MG/1
5 TABLET ORAL NIGHTLY
Qty: 90 TABLET | Refills: 1 | Status: SHIPPED | OUTPATIENT
Start: 2023-08-23

## 2023-08-23 RX ORDER — RAMIPRIL 10 MG/1
10 CAPSULE ORAL EVERY 12 HOURS
Qty: 180 CAPSULE | Refills: 1 | Status: SHIPPED | OUTPATIENT
Start: 2023-08-23

## 2023-08-23 NOTE — TELEPHONE ENCOUNTER
Rx Refill Note    Requested Prescriptions     Pending Prescriptions Disp Refills    amLODIPine (NORVASC) 5 MG tablet [Pharmacy Med Name: AMLODIPINE BESYLATE 5 MG TA 5 Tablet] 90 tablet 1     Sig: TAKE 1 TABLET BY MOUTH EVERY NIGHT.    ramipril (ALTACE) 10 MG capsule [Pharmacy Med Name: RAMIPRIL 10 MG CAPS 10 Capsule] 180 capsule 1     Sig: TAKE 1 CAPSULE BY MOUTH EVERY 12 (TWELVE) HOURS.        Last office visit with prescribing clinician: 3/21/2023       Next office visit with prescribing clinician: 9/19/2023     {    Zulma Wyatt MA  08/23/23, 11:42 EDT

## 2023-09-19 ENCOUNTER — OFFICE VISIT (OUTPATIENT)
Dept: ENDOCRINOLOGY | Facility: CLINIC | Age: 83
End: 2023-09-19
Payer: MEDICARE

## 2023-09-19 VITALS
BODY MASS INDEX: 23.15 KG/M2 | HEIGHT: 61 IN | WEIGHT: 122.6 LBS | OXYGEN SATURATION: 99 % | DIASTOLIC BLOOD PRESSURE: 70 MMHG | HEART RATE: 68 BPM | SYSTOLIC BLOOD PRESSURE: 138 MMHG

## 2023-09-19 DIAGNOSIS — E78.00 PURE HYPERCHOLESTEROLEMIA: ICD-10-CM

## 2023-09-19 DIAGNOSIS — E87.1 HYPONATREMIA: ICD-10-CM

## 2023-09-19 DIAGNOSIS — I10 BENIGN ESSENTIAL HYPERTENSION: Primary | ICD-10-CM

## 2023-09-19 PROCEDURE — 3078F DIAST BP <80 MM HG: CPT | Performed by: INTERNAL MEDICINE

## 2023-09-19 PROCEDURE — 1160F RVW MEDS BY RX/DR IN RCRD: CPT | Performed by: INTERNAL MEDICINE

## 2023-09-19 PROCEDURE — 99214 OFFICE O/P EST MOD 30 MIN: CPT | Performed by: INTERNAL MEDICINE

## 2023-09-19 PROCEDURE — 1159F MED LIST DOCD IN RCRD: CPT | Performed by: INTERNAL MEDICINE

## 2023-09-19 PROCEDURE — 3075F SYST BP GE 130 - 139MM HG: CPT | Performed by: INTERNAL MEDICINE

## 2023-09-19 PROCEDURE — 36415 COLL VENOUS BLD VENIPUNCTURE: CPT | Performed by: INTERNAL MEDICINE

## 2023-09-19 NOTE — PROGRESS NOTES
Zainab Golden 83 y.o.  CC:Follow-up, Hypertension, Hyperlipidemia, MNG, and Hypokalemia    [place]: Follow-up, Hypertension, Hyperlipidemia, MNG, and Hypokalemia    BP is good overall  Is eating low fat diet   Energy is good   Low sodium at last ov     Allergies   Allergen Reactions    Latex Other (See Comments)     Redness from adhesive tape       Current Outpatient Medications:     amLODIPine (NORVASC) 5 MG tablet, TAKE 1 TABLET BY MOUTH EVERY NIGHT., Disp: 90 tablet, Rfl: 1    ascorbic acid (VITAMIN C) 1000 MG tablet, Take 1,000 mg by mouth Daily., Disp: , Rfl:     aspirin 81 MG tablet, Take 1 tablet by mouth daily., Disp: , Rfl:     Biotin 5000 MCG tablet, Take  by mouth., Disp: , Rfl:     Epoetin Tru (PROCRIT IJ), Inject  as directed., Disp: , Rfl:     fluticasone (FLONASE) 50 MCG/ACT nasal spray, , Disp: , Rfl:     memantine (NAMENDA) 5 MG tablet, TAKE ONE TABLET BY MOUTH TWICE A DAY., Disp: 60 tablet, Rfl: 5    Misc Natural Products (OSTEO BI-FLEX TRIPLE STRENGTH) tablet, Take  by mouth., Disp: , Rfl:     Multiple Vitamins-Minerals (CENTRUM SILVER) tablet, Take 1 tablet by mouth daily., Disp: , Rfl:     pantoprazole (PROTONIX) 40 MG EC tablet, TAKE 1 TABLET BY MOUTH DAILY., Disp: 90 tablet, Rfl: 1    ramipril (ALTACE) 10 MG capsule, TAKE 1 CAPSULE BY MOUTH EVERY 12 (TWELVE) HOURS., Disp: 180 capsule, Rfl: 1    vitamin B-12 (CYANOCOBALAMIN) 1000 MCG tablet, Take 1,000 mcg by mouth Daily., Disp: , Rfl:   Patient Active Problem List    Diagnosis     MDS (myelodysplastic syndrome), low grade [D46.Z]     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]     Prediabetes [R73.03]     Hypercalcemia [E83.52]     Elevated glucose [R73.09]     Ingrown right big toenail [L60.0]     Mild cognitive impairment [G31.84]     Vitamin D deficiency [E55.9]     Closed fracture of right ankle with delayed healing [S82.891G]     Disorder of bone density and structure, unspecified [M85.9]     Other adult osteomalacia  [M83.8]     Acute cystitis without  hematuria [N30.00]     Benign essential hypertension [I10]     Cardiac arrhythmia [I49.9]     Cervical radiculopathy [M54.12]     H/O colonoscopy [Z98.890]     Migraine [G43.909]     Gastroesophageal reflux disease [K21.9]     Pure hypercholesterolemia [E78.00]     Diffuse goiter [E04.0]     Hypertension [I10]     Hypokalemia [E87.6]     Hyponatremia [E87.1]     Knee pain [M25.569]     Low back pain [M54.50]     Abnormal mammogram [R92.8]     Non-toxic multinodular goiter [E04.2]     Sacroiliac joint somatic dysfunction [M99.04]     Thrombocytopenia [D69.6]     Tick bite [W57.XXXA]     Ulcerative colitis [K51.90]     Varicose veins [I83.90]     Vasovagal syncope [R55]     Candidiasis of vagina [B37.31]      Review of Systems   Constitutional:  Negative for activity change, appetite change and unexpected weight change.   HENT:  Negative for congestion and rhinorrhea.    Eyes:  Negative for visual disturbance.   Respiratory:  Negative for cough and shortness of breath.    Cardiovascular:  Negative for palpitations and leg swelling.   Gastrointestinal:  Negative for constipation, diarrhea and nausea.   Genitourinary:  Negative for hematuria.   Musculoskeletal:  Negative for arthralgias, back pain, gait problem, joint swelling and myalgias.   Skin:  Negative for color change, rash and wound.   Allergic/Immunologic: Negative for environmental allergies, food allergies and immunocompromised state.   Neurological:  Negative for dizziness, weakness and light-headedness.   Psychiatric/Behavioral:  Negative for confusion, decreased concentration, dysphoric mood and sleep disturbance. The patient is not nervous/anxious.    Social History     Socioeconomic History    Marital status:    Tobacco Use    Smoking status: Never    Smokeless tobacco: Never   Vaping Use    Vaping Use: Never used   Substance and Sexual Activity    Alcohol use: No    Drug use: No    Sexual activity: Defer     Family History   Problem Relation Age  "of Onset    Heart disease Mother     Heart disease Father     Cancer Father         colon    Alcohol abuse Father     Hypertension Sister     Cancer Brother     Heart disease Brother     Breast cancer Neg Hx     Ovarian cancer Neg Hx      /70   Pulse 68   Ht 154.9 cm (61\")   Wt 55.6 kg (122 lb 9.6 oz)   LMP  (LMP Unknown)   SpO2 99%   BMI 23.17 kg/m²   Physical Exam  Vitals and nursing note reviewed.   Constitutional:       Appearance: Normal appearance. She is well-developed.   HENT:      Head: Normocephalic and atraumatic.   Eyes:      General: Lids are normal.      Extraocular Movements: Extraocular movements intact.      Conjunctiva/sclera: Conjunctivae normal.      Pupils: Pupils are equal, round, and reactive to light.   Neck:      Thyroid: No thyroid mass or thyromegaly.      Vascular: No carotid bruit.      Trachea: Trachea normal. No tracheal deviation.   Cardiovascular:      Rate and Rhythm: Normal rate and regular rhythm.      Pulses: Normal pulses.      Heart sounds: Normal heart sounds. No murmur heard.    No friction rub. No gallop.   Pulmonary:      Effort: Pulmonary effort is normal. No respiratory distress.      Breath sounds: Normal breath sounds. No wheezing.   Musculoskeletal:         General: No deformity. Normal range of motion.      Cervical back: Normal range of motion and neck supple.   Lymphadenopathy:      Cervical: No cervical adenopathy.   Skin:     General: Skin is warm and dry.      Findings: No erythema or rash.      Nails: There is no clubbing.   Neurological:      General: No focal deficit present.      Mental Status: She is alert and oriented to person, place, and time.      Cranial Nerves: No cranial nerve deficit.      Deep Tendon Reflexes: Reflexes are normal and symmetric. Reflexes normal.   Psychiatric:         Mood and Affect: Mood normal.         Speech: Speech normal.         Behavior: Behavior normal.         Thought Content: Thought content normal.         " Judgment: Judgment normal.     Results for orders placed or performed in visit on 03/21/23   Fructosamine    Specimen: Blood    Blood  Release to emely   Result Value Ref Range    Fructosamine 259 0 - 285 umol/L   Vitamin D,25-Hydroxy    Specimen: Blood    Blood  Release to emely   Result Value Ref Range    25 Hydroxy, Vitamin D 49.1 30.0 - 100.0 ng/mL   T4, Free    Specimen: Blood    Blood  Release to emely   Result Value Ref Range    Free T4 1.30 0.82 - 1.77 ng/dL   TSH    Specimen: Blood    Blood  Release to emely   Result Value Ref Range    TSH 2.040 0.450 - 4.500 uIU/mL   Lipid Panel    Specimen: Blood    Blood  Release to emely   Result Value Ref Range    Total Cholesterol 95 (L) 100 - 199 mg/dL    Triglycerides 62 0 - 149 mg/dL    HDL Cholesterol 35 (L) >39 mg/dL    VLDL Cholesterol Robert 14 5 - 40 mg/dL    LDL Chol Calc (NIH) 46 0 - 99 mg/dL   Comprehensive Metabolic Panel    Specimen: Blood    Blood  Release to emely   Result Value Ref Range    Glucose 91 70 - 99 mg/dL    BUN 17 8 - 27 mg/dL    Creatinine 0.62 0.57 - 1.00 mg/dL    EGFR Result 90 >59 mL/min/1.73    BUN/Creatinine Ratio 27 12 - 28    Sodium 133 (L) 134 - 144 mmol/L    Potassium 4.4 3.5 - 5.2 mmol/L    Chloride 98 96 - 106 mmol/L    Total CO2 24 20 - 29 mmol/L    Calcium 9.6 8.7 - 10.3 mg/dL    Total Protein 6.6 6.0 - 8.5 g/dL    Albumin 4.4 3.7 - 4.7 g/dL    Globulin 2.2 1.5 - 4.5 g/dL    A/G Ratio 2.0 1.2 - 2.2    Total Bilirubin 1.1 0.0 - 1.2 mg/dL    Alkaline Phosphatase 56 44 - 121 IU/L    AST (SGOT) 20 0 - 40 IU/L    ALT (SGPT) 18 0 - 32 IU/L     Diagnoses and all orders for this visit:    1. Benign essential hypertension (Primary)  Assessment & Plan:  BP is controlled taking amlodipine and altace  Continue monitoring  Check cmp     Orders:  -     Comprehensive Metabolic Panel; Future  -     Comprehensive Metabolic Panel    2. Hyponatremia  Assessment & Plan:  LOV - update cmp     Orders:  -     Comprehensive Metabolic Panel; Future  -      Comprehensive Metabolic Panel    3. Pure hypercholesterolemia  Assessment & Plan:  Is eating low fat diet  Check flp     Orders:  -     Lipid Panel; Future  -     TSH; Future  -     T4, Free; Future  -     T4, Free  -     TSH  -     Lipid Panel      Cynthia Farmer MD  Signed Cynthia Farmer MD

## 2023-09-20 LAB
ALBUMIN SERPL-MCNC: 5 G/DL (ref 3.5–5.2)
ALBUMIN/GLOB SERPL: 2.5 G/DL
ALP SERPL-CCNC: 57 U/L (ref 39–117)
ALT SERPL-CCNC: 14 U/L (ref 1–33)
AST SERPL-CCNC: 15 U/L (ref 1–32)
BILIRUB SERPL-MCNC: 1.8 MG/DL (ref 0–1.2)
BUN SERPL-MCNC: 15 MG/DL (ref 8–23)
BUN/CREAT SERPL: 23.4 (ref 7–25)
CALCIUM SERPL-MCNC: 10.5 MG/DL (ref 8.6–10.5)
CHLORIDE SERPL-SCNC: 102 MMOL/L (ref 98–107)
CHOLEST SERPL-MCNC: 109 MG/DL (ref 0–200)
CO2 SERPL-SCNC: 26.5 MMOL/L (ref 22–29)
CREAT SERPL-MCNC: 0.64 MG/DL (ref 0.57–1)
EGFRCR SERPLBLD CKD-EPI 2021: 88.9 ML/MIN/1.73
GLOBULIN SER CALC-MCNC: 2 GM/DL
GLUCOSE SERPL-MCNC: 94 MG/DL (ref 65–99)
HDLC SERPL-MCNC: 53 MG/DL (ref 40–60)
LDLC SERPL CALC-MCNC: 44 MG/DL (ref 0–100)
POTASSIUM SERPL-SCNC: 4.6 MMOL/L (ref 3.5–5.2)
PROT SERPL-MCNC: 7 G/DL (ref 6–8.5)
SODIUM SERPL-SCNC: 136 MMOL/L (ref 136–145)
T4 FREE SERPL-MCNC: 1.32 NG/DL (ref 0.93–1.7)
TRIGL SERPL-MCNC: 49 MG/DL (ref 0–150)
TSH SERPL DL<=0.005 MIU/L-ACNC: 2.89 UIU/ML (ref 0.27–4.2)
VLDLC SERPL CALC-MCNC: 12 MG/DL (ref 5–40)

## 2023-11-20 DIAGNOSIS — I10 ESSENTIAL HYPERTENSION: ICD-10-CM

## 2023-11-20 RX ORDER — AMLODIPINE BESYLATE 5 MG/1
5 TABLET ORAL NIGHTLY
Qty: 90 TABLET | Refills: 1 | Status: SHIPPED | OUTPATIENT
Start: 2023-11-20

## 2023-11-20 NOTE — TELEPHONE ENCOUNTER
Rx Refill Note    Requested Prescriptions     Pending Prescriptions Disp Refills    amLODIPine (NORVASC) 5 MG tablet [Pharmacy Med Name: AMLODIPINE BESYLATE 5 MG TA 5 Tablet] 90 tablet 1     Sig: TAKE 1 TABLET BY MOUTH EVERY NIGHT.        Last office visit with prescribing clinician: 9/19/2023       Next office visit with prescribing clinician: 3/26/2024     {    Zulma Wyatt MA  11/20/23, 15:02 EST

## 2023-11-24 DIAGNOSIS — I10 ESSENTIAL HYPERTENSION: ICD-10-CM

## 2023-11-27 RX ORDER — RAMIPRIL 10 MG/1
10 CAPSULE ORAL EVERY 12 HOURS
Qty: 180 CAPSULE | Refills: 1 | Status: SHIPPED | OUTPATIENT
Start: 2023-11-27

## 2023-12-05 RX ORDER — MEMANTINE HYDROCHLORIDE 5 MG/1
TABLET ORAL
Qty: 60 TABLET | Refills: 5 | Status: SHIPPED | OUTPATIENT
Start: 2023-12-05

## 2023-12-05 NOTE — TELEPHONE ENCOUNTER
Rx Refill Note    Requested Prescriptions     Pending Prescriptions Disp Refills    memantine (NAMENDA) 5 MG tablet [Pharmacy Med Name: MEMANTINE HCL 5 MG TABS 5 Tablet] 60 tablet 5     Sig: TAKE ONE TABLET BY MOUTH TWICE A DAY.        Last office visit with prescribing clinician: 9/19/2023   with prescribing clinician: 3/26/2024   {

## 2024-04-05 ENCOUNTER — TRANSCRIBE ORDERS (OUTPATIENT)
Dept: ADMINISTRATIVE | Facility: HOSPITAL | Age: 84
End: 2024-04-05
Payer: MEDICARE

## 2024-04-05 DIAGNOSIS — Z12.31 SCREENING MAMMOGRAM FOR BREAST CANCER: Primary | ICD-10-CM

## 2024-05-08 ENCOUNTER — HOSPITAL ENCOUNTER (OUTPATIENT)
Dept: MAMMOGRAPHY | Facility: HOSPITAL | Age: 84
Discharge: HOME | End: 2024-05-08
Admitting: OBSTETRICS & GYNECOLOGY
Payer: MEDICARE

## 2024-05-08 DIAGNOSIS — Z12.31 SCREENING MAMMOGRAM FOR BREAST CANCER: ICD-10-CM

## 2024-05-08 PROCEDURE — 77067 SCR MAMMO BI INCL CAD: CPT

## 2024-05-08 PROCEDURE — 77063 BREAST TOMOSYNTHESIS BI: CPT

## 2024-05-15 DIAGNOSIS — I10 ESSENTIAL HYPERTENSION: ICD-10-CM

## 2024-05-16 RX ORDER — AMLODIPINE BESYLATE 5 MG/1
5 TABLET ORAL NIGHTLY
Qty: 90 TABLET | Refills: 0 | Status: SHIPPED | OUTPATIENT
Start: 2024-05-16

## 2024-05-16 NOTE — TELEPHONE ENCOUNTER
Rx Refill Note  Requested Prescriptions     Pending Prescriptions Disp Refills    amLODIPine (NORVASC) 5 MG tablet [Pharmacy Med Name: AMLODIPINE BESYLATE 5 MG TA 5 Tablet] 90 tablet 1     Sig: TAKE 1 TABLET BY MOUTH EVERY NIGHT.      Last office visit with prescribing clinician: 9/19/2023     Next office visit with prescribing clinician: 8/6/2024

## 2024-06-03 RX ORDER — MEMANTINE HYDROCHLORIDE 5 MG/1
TABLET ORAL
Qty: 60 TABLET | Refills: 0 | Status: SHIPPED | OUTPATIENT
Start: 2024-06-03

## 2024-06-03 NOTE — TELEPHONE ENCOUNTER
Rx Refill Note    Requested Prescriptions     Pending Prescriptions Disp Refills    memantine (NAMENDA) 5 MG tablet [Pharmacy Med Name: MEMANTINE HCL 5 MG TABS 5 Tablet] 60 tablet 5     Sig: TAKE ONE TABLET BY MOUTH TWICE A DAY.        Last office visit with prescribing clinician: 9/19/2023       Next office visit with prescribing clinician: 8/6/2024   {    Liberty Berry MA  06/03/24, 15:55 EDT

## 2024-08-01 RX ORDER — MEMANTINE HYDROCHLORIDE 5 MG/1
5 TABLET ORAL 2 TIMES DAILY
Qty: 60 TABLET | Refills: 0 | Status: SHIPPED | OUTPATIENT
Start: 2024-08-01

## 2024-08-01 NOTE — TELEPHONE ENCOUNTER
Rx Refill Note  Requested Prescriptions     Pending Prescriptions Disp Refills    memantine (NAMENDA) 5 MG tablet 60 tablet 0     Sig: Take 1 tablet by mouth 2 (Two) Times a Day.      Last office visit with prescribing clinician: 9/19/2023     Next office visit with prescribing clinician: 8/6/2024     Rosalee Curz MA  08/01/24, 13:30 EDT

## 2024-08-06 ENCOUNTER — OFFICE VISIT (OUTPATIENT)
Dept: ENDOCRINOLOGY | Facility: CLINIC | Age: 84
End: 2024-08-06
Payer: MEDICARE

## 2024-08-06 VITALS
WEIGHT: 133.2 LBS | DIASTOLIC BLOOD PRESSURE: 66 MMHG | HEIGHT: 61 IN | SYSTOLIC BLOOD PRESSURE: 136 MMHG | BODY MASS INDEX: 25.15 KG/M2 | HEART RATE: 68 BPM | OXYGEN SATURATION: 97 %

## 2024-08-06 DIAGNOSIS — E04.0 DIFFUSE GOITER: ICD-10-CM

## 2024-08-06 DIAGNOSIS — E78.00 PURE HYPERCHOLESTEROLEMIA: Primary | ICD-10-CM

## 2024-08-06 DIAGNOSIS — I10 BENIGN ESSENTIAL HYPERTENSION: ICD-10-CM

## 2024-08-06 DIAGNOSIS — R73.09 ELEVATED GLUCOSE: ICD-10-CM

## 2024-08-06 DIAGNOSIS — E55.9 VITAMIN D DEFICIENCY: ICD-10-CM

## 2024-08-06 LAB
25(OH)D3 SERPL-MCNC: 25.5 NG/ML (ref 30–100)
ALBUMIN SERPL-MCNC: 4.4 G/DL (ref 3.5–5.2)
ALBUMIN/GLOB SERPL: 2.1 G/DL
ALP SERPL-CCNC: 54 U/L (ref 39–117)
ALT SERPL W P-5'-P-CCNC: 15 U/L (ref 1–33)
ANION GAP SERPL CALCULATED.3IONS-SCNC: 9 MMOL/L (ref 5–15)
AST SERPL-CCNC: 14 U/L (ref 1–32)
BILIRUB SERPL-MCNC: 1.5 MG/DL (ref 0–1.2)
BUN SERPL-MCNC: 20 MG/DL (ref 8–23)
BUN/CREAT SERPL: 27 (ref 7–25)
CALCIUM SPEC-SCNC: 10.2 MG/DL (ref 8.6–10.5)
CHLORIDE SERPL-SCNC: 102 MMOL/L (ref 98–107)
CHOLEST SERPL-MCNC: 83 MG/DL (ref 0–200)
CO2 SERPL-SCNC: 25 MMOL/L (ref 22–29)
CREAT SERPL-MCNC: 0.74 MG/DL (ref 0.57–1)
EGFRCR SERPLBLD CKD-EPI 2021: 80.9 ML/MIN/1.73
GLOBULIN UR ELPH-MCNC: 2.1 GM/DL
GLUCOSE SERPL-MCNC: 104 MG/DL (ref 65–99)
HBA1C MFR BLD: 5.4 % (ref 4.8–5.6)
HDLC SERPL-MCNC: 41 MG/DL (ref 40–60)
LDLC SERPL CALC-MCNC: 30 MG/DL (ref 0–100)
LDLC/HDLC SERPL: 0.8 {RATIO}
POTASSIUM SERPL-SCNC: 4.4 MMOL/L (ref 3.5–5.2)
PROT SERPL-MCNC: 6.5 G/DL (ref 6–8.5)
SODIUM SERPL-SCNC: 136 MMOL/L (ref 136–145)
T4 FREE SERPL-MCNC: 1.29 NG/DL (ref 0.92–1.68)
TRIGL SERPL-MCNC: 47 MG/DL (ref 0–150)
TSH SERPL DL<=0.05 MIU/L-ACNC: 2.97 UIU/ML (ref 0.27–4.2)
VLDLC SERPL-MCNC: 12 MG/DL (ref 5–40)

## 2024-08-06 PROCEDURE — 82306 VITAMIN D 25 HYDROXY: CPT | Performed by: INTERNAL MEDICINE

## 2024-08-06 PROCEDURE — 3075F SYST BP GE 130 - 139MM HG: CPT | Performed by: INTERNAL MEDICINE

## 2024-08-06 PROCEDURE — 1159F MED LIST DOCD IN RCRD: CPT | Performed by: INTERNAL MEDICINE

## 2024-08-06 PROCEDURE — 36415 COLL VENOUS BLD VENIPUNCTURE: CPT | Performed by: INTERNAL MEDICINE

## 2024-08-06 PROCEDURE — 80061 LIPID PANEL: CPT | Performed by: INTERNAL MEDICINE

## 2024-08-06 PROCEDURE — 84439 ASSAY OF FREE THYROXINE: CPT | Performed by: INTERNAL MEDICINE

## 2024-08-06 PROCEDURE — 1160F RVW MEDS BY RX/DR IN RCRD: CPT | Performed by: INTERNAL MEDICINE

## 2024-08-06 PROCEDURE — 3078F DIAST BP <80 MM HG: CPT | Performed by: INTERNAL MEDICINE

## 2024-08-06 PROCEDURE — 99214 OFFICE O/P EST MOD 30 MIN: CPT | Performed by: INTERNAL MEDICINE

## 2024-08-06 PROCEDURE — 80053 COMPREHEN METABOLIC PANEL: CPT | Performed by: INTERNAL MEDICINE

## 2024-08-06 PROCEDURE — 83036 HEMOGLOBIN GLYCOSYLATED A1C: CPT | Performed by: INTERNAL MEDICINE

## 2024-08-06 PROCEDURE — 84443 ASSAY THYROID STIM HORMONE: CPT | Performed by: INTERNAL MEDICINE

## 2024-08-06 RX ORDER — SENNOSIDES 8.6 MG
650 CAPSULE ORAL EVERY 8 HOURS PRN
COMMUNITY

## 2024-08-06 RX ORDER — MESALAMINE 0.38 G/1
375 CAPSULE, EXTENDED RELEASE ORAL DAILY
COMMUNITY

## 2024-08-06 RX ORDER — FERROUS GLUCONATE 324(38)MG
324 TABLET ORAL
COMMUNITY

## 2024-08-06 NOTE — PROGRESS NOTES
Zainab Golden 84 y.o.  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  C/o fatigue   Bp is good   Is taking norvasc and altace    Allergies   Allergen Reactions    Latex Other (See Comments)     Redness from adhesive tape       Current Outpatient Medications:     acetaminophen (TYLENOL) 650 MG 8 hr tablet, Take 1 tablet by mouth Every 8 (Eight) Hours As Needed for Mild Pain., Disp: , Rfl:     amLODIPine (NORVASC) 5 MG tablet, TAKE 1 TABLET BY MOUTH EVERY NIGHT., Disp: 90 tablet, Rfl: 0    ascorbic acid (VITAMIN C) 1000 MG tablet, Take 1 tablet by mouth Daily., Disp: , Rfl:     aspirin 81 MG tablet, Take 1 tablet by mouth Daily., Disp: , Rfl:     ferrous gluconate (FERGON) 324 MG tablet, Take 1 tablet by mouth Daily With Breakfast., Disp: , Rfl:     memantine (NAMENDA) 5 MG tablet, Take 1 tablet by mouth 2 (Two) Times a Day., Disp: 60 tablet, Rfl: 0    mesalamine (APRISO) 0.375 g 24 hr capsule, Take 1 capsule by mouth Daily., Disp: , Rfl:     Misc Natural Products (OSTEO BI-FLEX TRIPLE STRENGTH) tablet, Take  by mouth., Disp: , Rfl:     Multiple Vitamins-Minerals (CENTRUM SILVER) tablet, Take 1 tablet by mouth Daily., Disp: , Rfl:     ramipril (ALTACE) 10 MG capsule, TAKE 1 CAPSULE BY MOUTH EVERY 12 (TWELVE) HOURS., Disp: 180 capsule, Rfl: 1    vitamin B-12 (CYANOCOBALAMIN) 1000 MCG tablet, Take 1 tablet by mouth Daily., Disp: , Rfl:     Epoetin Tru (PROCRIT IJ), Inject  as directed. 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]     Prediabetes [R73.03]     Hypercalcemia [E83.52]     Elevated glucose [R73.09]     Ingrown right big toenail [L60.0]     Mild cognitive impairment [G31.84]     Vitamin D deficiency [E55.9]     Closed fracture of right ankle with delayed healing [S82.891G]      Disorder of bone density and structure, unspecified [M85.9]     Other adult osteomalacia  [M83.8]     Acute cystitis without hematuria [N30.00]     Benign essential hypertension [I10]     Cardiac arrhythmia [I49.9]     Cervical radiculopathy [M54.12]     H/O colonoscopy [Z98.890]     Migraine [G43.909]     Gastroesophageal reflux disease [K21.9]     Pure hypercholesterolemia [E78.00]     Diffuse goiter [E04.0]     Hypertension [I10]     Hypokalemia [E87.6]     Hyponatremia [E87.1]     Knee pain [M25.569]     Low back pain [M54.50]     Abnormal mammogram [R92.8]     Non-toxic multinodular goiter [E04.2]     Sacroiliac joint somatic dysfunction [M99.04]     Thrombocytopenia [D69.6]     Tick bite [W57.XXXA]     Ulcerative colitis [K51.90]     Varicose veins [I83.90]     Vasovagal syncope [R55]     Candidiasis of vagina [B37.31]      Review of Systems   Constitutional:  Positive for activity change and fatigue. Negative for appetite change and unexpected weight change.   HENT:  Negative for congestion and rhinorrhea.    Eyes:  Negative for visual disturbance.   Respiratory:  Negative for cough and shortness of breath.    Cardiovascular:  Negative for palpitations and leg swelling.   Gastrointestinal:  Negative for constipation, diarrhea and nausea.   Genitourinary:  Negative for hematuria.   Musculoskeletal:  Negative for arthralgias, back pain, gait problem, joint swelling and myalgias.   Skin:  Negative for color change, rash and wound.   Allergic/Immunologic: Negative for environmental allergies, food allergies and immunocompromised state.   Neurological:  Negative for dizziness, weakness and light-headedness.   Psychiatric/Behavioral:  Negative for confusion, decreased concentration, dysphoric mood and sleep disturbance. The patient is not nervous/anxious.      Social History     Socioeconomic History    Marital status:    Tobacco Use    Smoking status: Never     Passive exposure: Never    Smokeless tobacco:  "Never   Vaping Use    Vaping status: Never Used   Substance and Sexual Activity    Alcohol use: No    Drug use: No    Sexual activity: Defer     Family History   Problem Relation Age of Onset    Heart disease Mother     Heart disease Father     Cancer Father         colon    Alcohol abuse Father     Hypertension Sister     Cancer Brother     Heart disease Brother     Breast cancer Neg Hx     Ovarian cancer Neg Hx      /66 (BP Location: Left arm, Patient Position: Sitting, Cuff Size: Adult)   Pulse 68   Ht 154.9 cm (61\")   Wt 60.4 kg (133 lb 3.2 oz)   LMP  (LMP Unknown)   SpO2 97%   BMI 25.17 kg/m²   Physical Exam  Vitals and nursing note reviewed.   Constitutional:       Appearance: Normal appearance. She is well-developed.   HENT:      Head: Normocephalic and atraumatic.   Eyes:      General: Lids are normal.      Extraocular Movements: Extraocular movements intact.      Conjunctiva/sclera: Conjunctivae normal.      Pupils: Pupils are equal, round, and reactive to light.   Neck:      Thyroid: No thyroid mass or thyromegaly.      Vascular: No carotid bruit.      Trachea: Trachea normal. No tracheal deviation.   Cardiovascular:      Rate and Rhythm: Normal rate and regular rhythm.      Pulses: Normal pulses.      Heart sounds: Normal heart sounds. No murmur heard.     No friction rub. No gallop.   Pulmonary:      Effort: Pulmonary effort is normal. No respiratory distress.      Breath sounds: Normal breath sounds. No wheezing.   Musculoskeletal:         General: No deformity. Normal range of motion.      Cervical back: Normal range of motion and neck supple.   Lymphadenopathy:      Cervical: No cervical adenopathy.   Skin:     General: Skin is warm and dry.      Findings: No erythema or rash.      Nails: There is no clubbing.   Neurological:      General: No focal deficit present.      Mental Status: She is alert and oriented to person, place, and time.      Cranial Nerves: No cranial nerve deficit.      " Deep Tendon Reflexes: Reflexes are normal and symmetric. Reflexes normal.   Psychiatric:         Speech: Speech normal.         Behavior: Behavior normal.         Thought Content: Thought content normal.         Judgment: Judgment normal.       Results for orders placed or performed in visit on 09/19/23   T4, Free    Specimen: Blood    Blood  Release to emely   Result Value Ref Range    Free T4 1.32 0.93 - 1.70 ng/dL   TSH    Specimen: Blood    Blood  Release to emely   Result Value Ref Range    TSH 2.890 0.270 - 4.200 uIU/mL   Lipid Panel    Specimen: Blood    Blood  Release to emely   Result Value Ref Range    Total Cholesterol 109 0 - 200 mg/dL    Triglycerides 49 0 - 150 mg/dL    HDL Cholesterol 53 40 - 60 mg/dL    VLDL Cholesterol Robert 12 5 - 40 mg/dL    LDL Chol Calc (NIH) 44 0 - 100 mg/dL   Comprehensive Metabolic Panel    Specimen: Blood    Blood  Release to emely   Result Value Ref Range    Glucose 94 65 - 99 mg/dL    BUN 15 8 - 23 mg/dL    Creatinine 0.64 0.57 - 1.00 mg/dL    EGFR Result 88.9 >60.0 mL/min/1.73    BUN/Creatinine Ratio 23.4 7.0 - 25.0    Sodium 136 136 - 145 mmol/L    Potassium 4.6 3.5 - 5.2 mmol/L    Chloride 102 98 - 107 mmol/L    Total CO2 26.5 22.0 - 29.0 mmol/L    Calcium 10.5 8.6 - 10.5 mg/dL    Total Protein 7.0 6.0 - 8.5 g/dL    Albumin 5.0 3.5 - 5.2 g/dL    Globulin 2.0 gm/dL    A/G Ratio 2.5 g/dL    Total Bilirubin 1.8 (H) 0.0 - 1.2 mg/dL    Alkaline Phosphatase 57 39 - 117 U/L    AST (SGOT) 15 1 - 32 U/L    ALT (SGPT) 14 1 - 33 U/L     Diagnoses and all orders for this visit:    1. Pure hypercholesterolemia (Primary)  Assessment & Plan:  Eating low fat diet- check flp     Orders:  -     Lipid Panel; Future  -     Lipid Panel    2. Benign essential hypertension  Assessment & Plan:  Controlled with current medications   Continue monitoring and medications     Orders:  -     Comprehensive Metabolic Panel; Future  -     Comprehensive Metabolic Panel    3. Diffuse goiter  Assessment &  Plan:  Stable exam- check tfts     Orders:  -     TSH; Future  -     T4, Free; Future  -     TSH  -     T4, Free    4. Vitamin D deficiency  Assessment & Plan:  Continue supplement, update levels     Orders:  -     Vitamin D,25-Hydroxy; Future  -     Vitamin D,25-Hydroxy    5. Elevated glucose  Assessment & Plan:  Getting erythropoietin- check A1c but likely will be low     Orders:  -     Hemoglobin A1c; Future  -     Hemoglobin A1c    Return in about 1 year (around 8/6/2025) for Recheck.    Electronically signed by: Cynthia Farmer MD

## 2024-08-23 DIAGNOSIS — I10 ESSENTIAL HYPERTENSION: ICD-10-CM

## 2024-08-23 RX ORDER — RAMIPRIL 10 MG/1
10 CAPSULE ORAL EVERY 12 HOURS
Qty: 180 CAPSULE | Refills: 3 | Status: SHIPPED | OUTPATIENT
Start: 2024-08-23

## 2024-08-23 NOTE — TELEPHONE ENCOUNTER
Rx Refill Note  Requested Prescriptions     Pending Prescriptions Disp Refills    ramipril (ALTACE) 10 MG capsule [Pharmacy Med Name: RAMIPRIL 10 MG CAPS 10 Capsule] 180 capsule 1     Sig: TAKE ONE CAPSULE BY MOUTH EVERY 12 HOURS      Last office visit with prescribing clinician: 8/6/2024   Next office visit with prescribing clinician: 8/7/2025                           Tarsha Aponte MA  08/23/24, 10:09 EDT

## 2024-08-30 RX ORDER — MEMANTINE HYDROCHLORIDE 5 MG/1
5 TABLET ORAL 2 TIMES DAILY
Qty: 60 TABLET | Refills: 0 | Status: SHIPPED | OUTPATIENT
Start: 2024-08-30

## 2024-08-30 RX ORDER — MEMANTINE HYDROCHLORIDE 5 MG/1
5 TABLET ORAL 2 TIMES DAILY
Qty: 60 TABLET | Refills: 0 | OUTPATIENT
Start: 2024-08-30

## 2024-08-30 NOTE — TELEPHONE ENCOUNTER
Rx Refill Note  Requested Prescriptions     Pending Prescriptions Disp Refills    memantine (NAMENDA) 5 MG tablet [Pharmacy Med Name: MEMANTINE HCL 5 MG TABS 5 Tablet] 60 tablet 0     Sig: TAKE 1 TABLET BY MOUTH 2 (TWO) TIMES A DAY.      Last office visit with prescribing clinician: 8/6/2024   Next office visit with prescribing clinician: 8/30/2024                           Tarsha Aponte MA  08/30/24, 10:46 EDT

## 2024-08-30 NOTE — TELEPHONE ENCOUNTER
Caller: Zainab Golden    Relationship: Self    Best call back number: [phone]    Requested Prescriptions:   Requested Prescriptions     Pending Prescriptions Disp Refills    memantine (NAMENDA) 5 MG tablet 60 tablet 0     Sig: Take 1 tablet by mouth 2 (Two) Times a Day.        Pharmacy where request should be sent:    [facility]  Last office visit with prescribing clinician: 8/6/2024   Last telemedicine visit with prescribing clinician: Visit date not found   Next office visit with prescribing clinician: 8/7/2025     Additional details provided by patient:     Does the patient have less than a 3 day supply:  [x] Yes  [] No    Would you like a call back once the refill request has been completed: [] Yes [] No    If the office needs to give you a call back, can they leave a voicemail: [] Yes [] No    Skyler Terry Rep   08/30/24 09:32 EDT

## 2024-10-01 RX ORDER — MEMANTINE HYDROCHLORIDE 5 MG/1
5 TABLET ORAL 2 TIMES DAILY
Qty: 60 TABLET | Refills: 8 | Status: SHIPPED | OUTPATIENT
Start: 2024-10-01

## 2024-10-01 NOTE — TELEPHONE ENCOUNTER
PT CALLED REQUESTING MAMENTINE RX TO BE SENT IN TO [facility] IN [place], [place].

## 2025-03-07 DIAGNOSIS — I10 ESSENTIAL HYPERTENSION: ICD-10-CM

## 2025-03-07 NOTE — TELEPHONE ENCOUNTER
Rx Refill Note  Requested Prescriptions     Pending Prescriptions Disp Refills    amLODIPine (NORVASC) 5 MG tablet 90 tablet 0     Sig: Take 1 tablet by mouth Every Night.        Last office visit with prescribing clinician: 8/6/2024      Next office visit with prescribing clinician: 8/21/2025       Melvina Bhatti ([place])  03/07/25, 13:42 EST

## 2025-03-10 RX ORDER — AMLODIPINE BESYLATE 5 MG/1
5 TABLET ORAL NIGHTLY
Qty: 90 TABLET | Refills: 1 | Status: SHIPPED | OUTPATIENT
Start: 2025-03-10

## 2025-04-09 ENCOUNTER — TRANSCRIBE ORDERS (OUTPATIENT)
Dept: ADMINISTRATIVE | Facility: HOSPITAL | Age: 85
End: 2025-04-09
Payer: MEDICARE

## 2025-04-09 DIAGNOSIS — Z12.31 SCREENING MAMMOGRAM FOR BREAST CANCER: Primary | ICD-10-CM

## 2025-05-09 ENCOUNTER — HOSPITAL ENCOUNTER (OUTPATIENT)
Dept: MAMMOGRAPHY | Facility: HOSPITAL | Age: 85
Discharge: HOME | End: 2025-05-09
Admitting: OBSTETRICS & GYNECOLOGY
Payer: MEDICARE

## 2025-05-09 DIAGNOSIS — Z12.31 SCREENING MAMMOGRAM FOR BREAST CANCER: ICD-10-CM

## 2025-05-09 PROCEDURE — 77067 SCR MAMMO BI INCL CAD: CPT

## 2025-05-09 PROCEDURE — 77063 BREAST TOMOSYNTHESIS BI: CPT

## 2025-05-23 DIAGNOSIS — I10 ESSENTIAL HYPERTENSION: ICD-10-CM

## 2025-05-23 RX ORDER — RAMIPRIL 10 MG/1
10 CAPSULE ORAL EVERY 12 HOURS
Qty: 180 CAPSULE | Refills: 0 | Status: SHIPPED | OUTPATIENT
Start: 2025-05-23

## 2025-05-23 NOTE — TELEPHONE ENCOUNTER
Rx Refill Note  Requested Prescriptions     Pending Prescriptions Disp Refills    ramipril (ALTACE) 10 MG capsule [Pharmacy Med Name: RAMIPRIL 10 MG CAPS 10 Capsule] 180 capsule 4     Sig: TAKE ONE CAPSULE BY MOUTH EVERY 12 HOURS      Last office visit with prescribing clinician: 8/6/2024   Last telemedicine visit with prescribing clinician: Visit date not found   Next office visit with prescribing clinician: 8/21/2025       Katherin Rabago MA  05/23/25, 10:01 EDT

## 2025-06-03 RX ORDER — MEMANTINE HYDROCHLORIDE 5 MG/1
5 TABLET ORAL 2 TIMES DAILY
Qty: 60 TABLET | Refills: 2 | Status: SHIPPED | OUTPATIENT
Start: 2025-06-03

## 2025-06-03 NOTE — TELEPHONE ENCOUNTER
Rx Refill Note  Requested Prescriptions     Pending Prescriptions Disp Refills    memantine (NAMENDA) 5 MG tablet [Pharmacy Med Name: MEMANTINE HCL 5 MG TABS 5 Tablet] 60 tablet 8     Sig: TAKE 1 TABLET BY MOUTH TWICE A DAY      Last office visit with prescribing clinician: 8/6/2024     Next office visit with prescribing clinician: 8/21/2025     Carlee Ledesma MA  06/03/25, 10:13 EDT

## 2025-07-07 RX ORDER — MEMANTINE HYDROCHLORIDE 5 MG/1
5 TABLET ORAL 2 TIMES DAILY
Qty: 60 TABLET | Refills: 8 | OUTPATIENT
Start: 2025-07-07

## 2025-07-07 NOTE — TELEPHONE ENCOUNTER
Rx Refill Note  Requested Prescriptions     Pending Prescriptions Disp Refills    memantine (NAMENDA) 5 MG tablet [Pharmacy Med Name: MEMANTINE HCL 5 MG TABS 5 Tablet] 60 tablet 8     Sig: TAKE 1 TABLET BY MOUTH TWICE A DAY      Last office visit with prescribing clinician: 8/6/2024      Next office visit with prescribing clinician: 8/21/2025     Denied.  Refills available at [place], [place]   {  Katherin Rabago MA  07/07/25, 13:04 EDT

## 2025-07-09 RX ORDER — MEMANTINE HYDROCHLORIDE 5 MG/1
5 TABLET ORAL 2 TIMES DAILY
Qty: 60 TABLET | Refills: 3 | Status: SHIPPED | OUTPATIENT
Start: 2025-07-09

## 2025-07-09 NOTE — TELEPHONE ENCOUNTER
Rx Refill Note  Requested Prescriptions     Pending Prescriptions Disp Refills    memantine (NAMENDA) 5 MG tablet [Pharmacy Med Name: MEMANTINE HCL 5 MG TABS 5 Tablet] 60 tablet 0     Sig: TAKE 1 TABLET BY MOUTH TWICE A DAY      Last office visit with prescribing clinician: 8/6/2024   Last telemedicine visit with prescribing clinician: Visit date not found   Next office visit with prescribing clinician: 8/21/2025                         Would you like a call back once the refill request has been completed: [] Yes [] No    If the office needs to give you a call back, can they leave a voicemail: [] Yes [] No    Michelle Grossman MA  07/09/25, 11:39 EDT

## 2025-08-21 ENCOUNTER — OFFICE VISIT (OUTPATIENT)
Dept: ENDOCRINOLOGY | Facility: CLINIC | Age: 85
End: 2025-08-21
Payer: MEDICARE

## 2025-08-21 VITALS
BODY MASS INDEX: 22.66 KG/M2 | SYSTOLIC BLOOD PRESSURE: 140 MMHG | HEART RATE: 76 BPM | WEIGHT: 120 LBS | DIASTOLIC BLOOD PRESSURE: 70 MMHG | HEIGHT: 61 IN

## 2025-08-21 DIAGNOSIS — R73.09 ELEVATED GLUCOSE: Primary | ICD-10-CM

## 2025-08-21 DIAGNOSIS — E78.00 PURE HYPERCHOLESTEROLEMIA: ICD-10-CM

## 2025-08-21 DIAGNOSIS — E55.9 VITAMIN D DEFICIENCY: ICD-10-CM

## 2025-08-21 DIAGNOSIS — I10 PRIMARY HYPERTENSION: ICD-10-CM

## 2025-08-21 LAB
EXPIRATION DATE: ABNORMAL
EXPIRATION DATE: NORMAL
GLUCOSE BLDC GLUCOMTR-MCNC: 144 MG/DL (ref 70–130)
HBA1C MFR BLD: 4.8 % (ref 4.5–5.7)
Lab: ABNORMAL
Lab: NORMAL

## 2025-08-22 LAB
25(OH)D3 SERPL-MCNC: 53.9 NG/ML (ref 30–100)
ALBUMIN SERPL-MCNC: 4.5 G/DL (ref 3.5–5.2)
ALBUMIN/GLOB SERPL: 2.1 G/DL
ALP SERPL-CCNC: 55 U/L (ref 39–117)
ALT SERPL W P-5'-P-CCNC: 19 U/L (ref 1–33)
ANION GAP SERPL CALCULATED.3IONS-SCNC: 12.2 MMOL/L (ref 5–15)
AST SERPL-CCNC: 18 U/L (ref 1–32)
BILIRUB SERPL-MCNC: 2.1 MG/DL (ref 0–1.2)
BUN SERPL-MCNC: 23 MG/DL (ref 8–23)
BUN/CREAT SERPL: 27.7 (ref 7–25)
CA-I SERPL ISE-MCNC: 1.4 MMOL/L (ref 1.15–1.35)
CALCIUM SPEC-SCNC: 10.4 MG/DL (ref 8.6–10.5)
CHLORIDE SERPL-SCNC: 105 MMOL/L (ref 98–107)
CHOLEST SERPL-MCNC: 87 MG/DL (ref 0–200)
CO2 SERPL-SCNC: 20.8 MMOL/L (ref 22–29)
CREAT SERPL-MCNC: 0.83 MG/DL (ref 0.57–1)
EGFRCR SERPLBLD CKD-EPI 2021: 70 ML/MIN/1.73
GLOBULIN UR ELPH-MCNC: 2.1 GM/DL
GLUCOSE SERPL-MCNC: 99 MG/DL (ref 65–99)
HDLC SERPL-MCNC: 46 MG/DL (ref 40–60)
LDLC SERPL CALC-MCNC: 31 MG/DL (ref 0–100)
LDLC/HDLC SERPL: 0.77 {RATIO}
POTASSIUM SERPL-SCNC: 4.5 MMOL/L (ref 3.5–5.2)
PROT SERPL-MCNC: 6.6 G/DL (ref 6–8.5)
SODIUM SERPL-SCNC: 138 MMOL/L (ref 136–145)
T4 FREE SERPL-MCNC: 1.12 NG/DL (ref 0.92–1.68)
TRIGL SERPL-MCNC: 29 MG/DL (ref 0–150)
TSH SERPL DL<=0.05 MIU/L-ACNC: 2.6 UIU/ML (ref 0.27–4.2)
VLDLC SERPL-MCNC: 10 MG/DL (ref 5–40)